# Patient Record
Sex: MALE | Race: WHITE | NOT HISPANIC OR LATINO
[De-identification: names, ages, dates, MRNs, and addresses within clinical notes are randomized per-mention and may not be internally consistent; named-entity substitution may affect disease eponyms.]

---

## 2018-10-25 ENCOUNTER — CHART COPY (OUTPATIENT)
Age: 53
End: 2018-10-25

## 2018-10-25 DIAGNOSIS — M25.562 PAIN IN RIGHT KNEE: ICD-10-CM

## 2018-10-25 DIAGNOSIS — M25.561 PAIN IN RIGHT KNEE: ICD-10-CM

## 2018-10-27 ENCOUNTER — APPOINTMENT (OUTPATIENT)
Dept: ORTHOPEDIC SURGERY | Facility: CLINIC | Age: 53
End: 2018-10-27
Payer: COMMERCIAL

## 2018-10-27 VITALS
DIASTOLIC BLOOD PRESSURE: 89 MMHG | BODY MASS INDEX: 37.22 KG/M2 | TEMPERATURE: 98.8 F | HEIGHT: 70 IN | SYSTOLIC BLOOD PRESSURE: 140 MMHG | WEIGHT: 260 LBS | HEART RATE: 76 BPM

## 2018-10-27 DIAGNOSIS — Z86.79 PERSONAL HISTORY OF OTHER DISEASES OF THE CIRCULATORY SYSTEM: ICD-10-CM

## 2018-10-27 DIAGNOSIS — Z78.9 OTHER SPECIFIED HEALTH STATUS: ICD-10-CM

## 2018-10-27 DIAGNOSIS — M17.11 UNILATERAL PRIMARY OSTEOARTHRITIS, RIGHT KNEE: ICD-10-CM

## 2018-10-27 DIAGNOSIS — M50.20 OTHER CERVICAL DISC DISPLACEMENT, UNSPECIFIED CERVICAL REGION: ICD-10-CM

## 2018-10-27 DIAGNOSIS — Z86.69 PERSONAL HISTORY OF OTHER DISEASES OF THE NERVOUS SYSTEM AND SENSE ORGANS: ICD-10-CM

## 2018-10-27 PROCEDURE — 99205 OFFICE O/P NEW HI 60 MIN: CPT

## 2018-10-27 PROCEDURE — 73564 X-RAY EXAM KNEE 4 OR MORE: CPT | Mod: RT

## 2018-10-27 RX ORDER — METAXALONE 800 MG/1
800 TABLET ORAL
Refills: 0 | Status: ACTIVE | COMMUNITY

## 2018-10-27 RX ORDER — PERINDOPRIL ERBUMINE 8 MG/1
TABLET ORAL
Refills: 0 | Status: ACTIVE | COMMUNITY

## 2018-10-27 RX ORDER — PREGABALIN 300 MG/1
CAPSULE ORAL
Refills: 0 | Status: ACTIVE | COMMUNITY

## 2018-10-27 RX ORDER — CODEINE SULFATE 60 MG/1
TABLET ORAL
Refills: 0 | Status: ACTIVE | COMMUNITY

## 2019-04-02 ENCOUNTER — EMERGENCY (EMERGENCY)
Facility: HOSPITAL | Age: 54
LOS: 1 days | Discharge: HOME | End: 2019-04-02
Attending: EMERGENCY MEDICINE
Payer: COMMERCIAL

## 2019-04-02 VITALS
WEIGHT: 240.08 LBS | OXYGEN SATURATION: 97 % | SYSTOLIC BLOOD PRESSURE: 191 MMHG | HEART RATE: 78 BPM | DIASTOLIC BLOOD PRESSURE: 80 MMHG | RESPIRATION RATE: 18 BRPM | TEMPERATURE: 98 F

## 2019-04-02 DIAGNOSIS — F19.951 OTHER PSYCHOACTIVE SUBSTANCE USE, UNSPECIFIED WITH PSYCHOACTIVE SUBSTANCE-INDUCED PSYCHOTIC DISORDER WITH HALLUCINATIONS: ICD-10-CM

## 2019-04-02 DIAGNOSIS — Z98.89 OTHER SPECIFIED POSTPROCEDURAL STATES: Chronic | ICD-10-CM

## 2019-04-02 DIAGNOSIS — Z96.659 PRESENCE OF UNSPECIFIED ARTIFICIAL KNEE JOINT: Chronic | ICD-10-CM

## 2019-04-02 DIAGNOSIS — F19.20 OTHER PSYCHOACTIVE SUBSTANCE DEPENDENCE, UNCOMPLICATED: ICD-10-CM

## 2019-04-02 DIAGNOSIS — R69 ILLNESS, UNSPECIFIED: ICD-10-CM

## 2019-04-02 LAB
ALBUMIN SERPL ELPH-MCNC: 3.7 G/DL — SIGNIFICANT CHANGE UP (ref 3.3–5.2)
ALP SERPL-CCNC: 44 U/L — SIGNIFICANT CHANGE UP (ref 40–120)
ALT FLD-CCNC: 46 U/L — HIGH
AMPHET UR-MCNC: NEGATIVE — SIGNIFICANT CHANGE UP
ANION GAP SERPL CALC-SCNC: 10 MMOL/L — SIGNIFICANT CHANGE UP (ref 5–17)
APAP SERPL-MCNC: <7.5 UG/ML — LOW (ref 10–26)
APPEARANCE UR: CLEAR — SIGNIFICANT CHANGE UP
APTT BLD: 29.8 SEC — SIGNIFICANT CHANGE UP (ref 27.5–36.3)
AST SERPL-CCNC: 66 U/L — HIGH
BARBITURATES UR SCN-MCNC: NEGATIVE — SIGNIFICANT CHANGE UP
BASOPHILS # BLD AUTO: 0 K/UL — SIGNIFICANT CHANGE UP (ref 0–0.2)
BASOPHILS NFR BLD AUTO: 0.3 % — SIGNIFICANT CHANGE UP (ref 0–2)
BENZODIAZ UR-MCNC: NEGATIVE — SIGNIFICANT CHANGE UP
BILIRUB SERPL-MCNC: 0.2 MG/DL — LOW (ref 0.4–2)
BILIRUB UR-MCNC: NEGATIVE — SIGNIFICANT CHANGE UP
BUN SERPL-MCNC: 15 MG/DL — SIGNIFICANT CHANGE UP (ref 8–20)
CALCIUM SERPL-MCNC: 8.7 MG/DL — SIGNIFICANT CHANGE UP (ref 8.6–10.2)
CHLORIDE SERPL-SCNC: 101 MMOL/L — SIGNIFICANT CHANGE UP (ref 98–107)
CO2 SERPL-SCNC: 29 MMOL/L — SIGNIFICANT CHANGE UP (ref 22–29)
COCAINE METAB.OTHER UR-MCNC: POSITIVE
COLOR SPEC: YELLOW — SIGNIFICANT CHANGE UP
CREAT SERPL-MCNC: 1.09 MG/DL — SIGNIFICANT CHANGE UP (ref 0.5–1.3)
DIFF PNL FLD: ABNORMAL
EOSINOPHIL # BLD AUTO: 0.1 K/UL — SIGNIFICANT CHANGE UP (ref 0–0.5)
EOSINOPHIL NFR BLD AUTO: 1 % — SIGNIFICANT CHANGE UP (ref 0–5)
EPI CELLS # UR: SIGNIFICANT CHANGE UP
ETHANOL SERPL-MCNC: <10 MG/DL — SIGNIFICANT CHANGE UP
GLUCOSE SERPL-MCNC: 76 MG/DL — SIGNIFICANT CHANGE UP (ref 70–115)
GLUCOSE UR QL: 50 MG/DL
HCT VFR BLD CALC: 43.9 % — SIGNIFICANT CHANGE UP (ref 42–52)
HGB BLD-MCNC: 13.8 G/DL — LOW (ref 14–18)
INR BLD: 0.98 RATIO — SIGNIFICANT CHANGE UP (ref 0.88–1.16)
KETONES UR-MCNC: NEGATIVE — SIGNIFICANT CHANGE UP
LEUKOCYTE ESTERASE UR-ACNC: NEGATIVE — SIGNIFICANT CHANGE UP
LYMPHOCYTES # BLD AUTO: 0.8 K/UL — LOW (ref 1–4.8)
LYMPHOCYTES # BLD AUTO: 12.3 % — LOW (ref 20–55)
MCHC RBC-ENTMCNC: 26.7 PG — LOW (ref 27–31)
MCHC RBC-ENTMCNC: 31.4 G/DL — LOW (ref 32–36)
MCV RBC AUTO: 85.1 FL — SIGNIFICANT CHANGE UP (ref 80–94)
METHADONE UR-MCNC: NEGATIVE — SIGNIFICANT CHANGE UP
MONOCYTES # BLD AUTO: 1 K/UL — HIGH (ref 0–0.8)
MONOCYTES NFR BLD AUTO: 13.9 % — HIGH (ref 3–10)
NEUTROPHILS # BLD AUTO: 4.9 K/UL — SIGNIFICANT CHANGE UP (ref 1.8–8)
NEUTROPHILS NFR BLD AUTO: 72.4 % — SIGNIFICANT CHANGE UP (ref 37–73)
NITRITE UR-MCNC: NEGATIVE — SIGNIFICANT CHANGE UP
OPIATES UR-MCNC: POSITIVE
PCP SPEC-MCNC: SIGNIFICANT CHANGE UP
PCP UR-MCNC: NEGATIVE — SIGNIFICANT CHANGE UP
PH UR: 6 — SIGNIFICANT CHANGE UP (ref 5–8)
PLATELET # BLD AUTO: 211 K/UL — SIGNIFICANT CHANGE UP (ref 150–400)
POTASSIUM SERPL-MCNC: 4.6 MMOL/L — SIGNIFICANT CHANGE UP (ref 3.5–5.3)
POTASSIUM SERPL-SCNC: 4.6 MMOL/L — SIGNIFICANT CHANGE UP (ref 3.5–5.3)
PROT SERPL-MCNC: 6.2 G/DL — LOW (ref 6.6–8.7)
PROT UR-MCNC: 30 MG/DL
PROTHROM AB SERPL-ACNC: 11.3 SEC — SIGNIFICANT CHANGE UP (ref 10–12.9)
RBC # BLD: 5.16 M/UL — SIGNIFICANT CHANGE UP (ref 4.6–6.2)
RBC # FLD: 15 % — SIGNIFICANT CHANGE UP (ref 11–15.6)
RBC CASTS # UR COMP ASSIST: SIGNIFICANT CHANGE UP /HPF (ref 0–4)
SALICYLATES SERPL-MCNC: <0.6 MG/DL — LOW (ref 10–20)
SODIUM SERPL-SCNC: 140 MMOL/L — SIGNIFICANT CHANGE UP (ref 135–145)
SP GR SPEC: 1.01 — SIGNIFICANT CHANGE UP (ref 1.01–1.02)
THC UR QL: NEGATIVE — SIGNIFICANT CHANGE UP
TSH SERPL-MCNC: 0.85 UIU/ML — SIGNIFICANT CHANGE UP (ref 0.27–4.2)
UROBILINOGEN FLD QL: NEGATIVE MG/DL — SIGNIFICANT CHANGE UP
WBC # BLD: 6.8 K/UL — SIGNIFICANT CHANGE UP (ref 4.8–10.8)
WBC # FLD AUTO: 6.8 K/UL — SIGNIFICANT CHANGE UP (ref 4.8–10.8)

## 2019-04-02 PROCEDURE — 99284 EMERGENCY DEPT VISIT MOD MDM: CPT

## 2019-04-02 PROCEDURE — 99213 OFFICE O/P EST LOW 20 MIN: CPT

## 2019-04-02 PROCEDURE — 70450 CT HEAD/BRAIN W/O DYE: CPT | Mod: 26

## 2019-04-02 PROCEDURE — 93010 ELECTROCARDIOGRAM REPORT: CPT

## 2019-04-02 RX ORDER — QUETIAPINE FUMARATE 200 MG/1
100 TABLET, FILM COATED ORAL ONCE
Qty: 0 | Refills: 0 | Status: COMPLETED | OUTPATIENT
Start: 2019-04-02 | End: 2019-04-02

## 2019-04-02 RX ORDER — OXYCODONE HYDROCHLORIDE 5 MG/1
15 TABLET ORAL EVERY 6 HOURS
Qty: 0 | Refills: 0 | Status: COMPLETED | OUTPATIENT
Start: 2019-04-02 | End: 2019-04-09

## 2019-04-02 RX ORDER — HALOPERIDOL DECANOATE 100 MG/ML
2 INJECTION INTRAMUSCULAR EVERY 6 HOURS
Qty: 0 | Refills: 0 | Status: DISCONTINUED | OUTPATIENT
Start: 2019-04-02 | End: 2019-04-07

## 2019-04-02 RX ORDER — LISINOPRIL 2.5 MG/1
20 TABLET ORAL DAILY
Qty: 0 | Refills: 0 | Status: DISCONTINUED | OUTPATIENT
Start: 2019-04-02 | End: 2019-04-07

## 2019-04-02 RX ORDER — HALOPERIDOL DECANOATE 100 MG/ML
2 INJECTION INTRAMUSCULAR EVERY 6 HOURS
Qty: 0 | Refills: 0 | Status: DISCONTINUED | OUTPATIENT
Start: 2019-04-02 | End: 2019-04-02

## 2019-04-02 RX ORDER — KETOROLAC TROMETHAMINE 30 MG/ML
15 SYRINGE (ML) INJECTION ONCE
Qty: 0 | Refills: 0 | Status: DISCONTINUED | OUTPATIENT
Start: 2019-04-02 | End: 2019-04-02

## 2019-04-02 RX ORDER — THIAMINE MONONITRATE (VIT B1) 100 MG
100 TABLET ORAL DAILY
Qty: 0 | Refills: 0 | Status: DISCONTINUED | OUTPATIENT
Start: 2019-04-02 | End: 2019-04-07

## 2019-04-02 RX ORDER — FOLIC ACID 0.8 MG
1 TABLET ORAL DAILY
Qty: 0 | Refills: 0 | Status: DISCONTINUED | OUTPATIENT
Start: 2019-04-02 | End: 2019-04-07

## 2019-04-02 RX ADMIN — QUETIAPINE FUMARATE 100 MILLIGRAM(S): 200 TABLET, FILM COATED ORAL at 22:52

## 2019-04-02 RX ADMIN — OXYCODONE HYDROCHLORIDE 15 MILLIGRAM(S): 5 TABLET ORAL at 23:55

## 2019-04-02 RX ADMIN — Medication 1 TABLET(S): at 17:45

## 2019-04-02 RX ADMIN — Medication 100 MILLIGRAM(S): at 17:45

## 2019-04-02 RX ADMIN — LISINOPRIL 20 MILLIGRAM(S): 2.5 TABLET ORAL at 17:46

## 2019-04-02 RX ADMIN — OXYCODONE HYDROCHLORIDE 15 MILLIGRAM(S): 5 TABLET ORAL at 17:46

## 2019-04-02 RX ADMIN — Medication 15 MILLIGRAM(S): at 13:29

## 2019-04-02 RX ADMIN — Medication 1 MILLIGRAM(S): at 17:45

## 2019-04-02 RX ADMIN — OXYCODONE HYDROCHLORIDE 15 MILLIGRAM(S): 5 TABLET ORAL at 19:08

## 2019-04-02 NOTE — ED PROVIDER NOTE - PROGRESS NOTE DETAILS
As per sign-out from Dr. Bey patient with abnormal behavior/psychosis. Patient will remain overnight for re-evaluation and may need inpatient psychiatric admission.

## 2019-04-02 NOTE — ED BEHAVIORAL HEALTH ASSESSMENT NOTE - DESCRIPTION (FIRST USE, LAST USE, QUANTITY, FREQUENCY, DURATION)
heavy episodic use more recently daily until saturday prescribed  90 mg daily use of old Rx for Xanax Adderall phentermine for weight loss

## 2019-04-02 NOTE — ED ADULT NURSE NOTE - NSIMPLEMENTINTERV_GEN_ALL_ED
Implemented All Universal Safety Interventions:  [place] to call system. Call bell, personal items and telephone within reach. Instruct patient to call for assistance. Room bathroom lighting operational. Non-slip footwear when patient is off stretcher. Physically safe environment: no spills, clutter or unnecessary equipment. Stretcher in lowest position, wheels locked, appropriate side rails in place. Implemented All Fall Risk Interventions:  [place] to call system. Call bell, personal items and telephone within reach. Instruct patient to call for assistance. Room bathroom lighting operational. Non-slip footwear when patient is off stretcher. Physically safe environment: no spills, clutter or unnecessary equipment. Stretcher in lowest position, wheels locked, appropriate side rails in place. Provide visual cue, wrist band, yellow gown, etc. Monitor gait and stability. Monitor for mental status changes and reorient to person, place, and time. Review medications for side effects contributing to fall risk. Reinforce activity limits and safety measures with patient and family.

## 2019-04-02 NOTE — ED ADULT NURSE NOTE - CAS EDN DISCHARGE ASSESSMENT
Alert and oriented to person, place and time/denies suicidal or homicidal ideation, denies any psychotic symptoms

## 2019-04-02 NOTE — SBIRT NOTE. - NSSBIRTSERVICES_GEN_A_ED_FT
Provided SBIRT services: Full screen positive. Brief Intervention Performed. Screening results were reviewed with the patient and patient was provided information about healthy guidelines and potential negative consequences associated with level of risk. Motivation and readiness to reduce or stop use was discussed and goals and activities to make changes were suggested/offered.   Pt stated that he recently completed a 90 day cycle of diet pills (he's not sure what type)   Audit Score: 4  DAST Score: 1  Duration = 10 Minutes

## 2019-04-02 NOTE — PHARMACOTHERAPY INTERVENTION NOTE - COMMENTS
Medication reconciliation completed by speaking to patient at bedside, as well as by calling the pharmacy and verifying with . He is picked up phentermine, pregabalin, oxycodone, and eszopiclone in March. Patient stated to be noncompliant with phentermine and pregabalin. He also stated to take alprazolam as needed for insomnia, although no history of prescription found in . Medication reconciliation completed by speaking to patient at bedside, as well as by calling the pharmacy and verifying with . He is picked up phentermine, pregabalin, oxycodone, and eszopiclone in March. Patient stated to be noncompliant with phentermine and pregabalin. He also stated to take alprazolam as needed for insomnia, although no history of prescription found in . Unable to verify strength and frequency of alprazolam.

## 2019-04-02 NOTE — ED BEHAVIORAL HEALTH ASSESSMENT NOTE - CURRENT MEDICATION
Phentermine 37.5mg PO QD Pregabalin 150mg PO BID  Viagra Cialis  Metaxalone 800mg PO TID  Perindopril 8mg PO QD  Eszopiclone 3mg PO QHS  Oxycodone 15mg PO Q4h

## 2019-04-02 NOTE — ED PROVIDER NOTE - CLINICAL SUMMARY MEDICAL DECISION MAKING FREE TEXT BOX
Pt brought in by family for increased paranoid delusions, visual hallucinations and emotional lability;  Plan to obtain labs/ CTh, EKG and psychiatric evaluation

## 2019-04-02 NOTE — ED BEHAVIORAL HEALTH ASSESSMENT NOTE - HPI (INCLUDE ILLNESS QUALITY, SEVERITY, DURATION, TIMING, CONTEXT, MODIFYING FACTORS, ASSOCIATED SIGNS AND SYMPTOMS)
presents with family for evaluation of recent onset psychotic episode as manifested by visual hallucinations, paranoid delusions, agitation Tore house apart looking for "bugs, camera and other devices" (son showed photo of ransacked house) He reports stressors of tasking in his brother's teenagers after his brother reported murdered his wife (pt's sister in law), numerous surgeries for orthopedic injuries, substance use including prescribed Lyrica, phentermine, oxycodone-90 mg daily, also old Rx for xanax , son's Adderall )Saturday night) and vodka significant daily use last Saturday, other stressors include 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

## 2019-04-02 NOTE — ED ADULT NURSE NOTE - CHIEF COMPLAINT QUOTE
as per family pt increasingly agitated son reports that pt has made paranoid statements accusing wife of cheating smashing her phone, asking if there are cameras following him.  pt denies al upon arrival   admitts to aggitation and anger denies s/i and h/i

## 2019-04-02 NOTE — ED BEHAVIORAL HEALTH ASSESSMENT NOTE - DETAILS
took in 2 of his brother's children after brother reportedly murdered their mother in January NA brother ? schizophrenia not relevent insomnia family present Dr Heard

## 2019-04-02 NOTE — ED PROVIDER NOTE - OBJECTIVE STATEMENT
53yoM with h/o ADHD, dyslexia, chronic back pain , R sided sciatica, and b/l knee pain with h/o back and bl/ knee surgery;  brought in by family for increasing paranoid behavior, visual hallucinations, angry outbursts.  Family notes that pt has a long history of being overly paranoid but is functional, owns his own business;  they note that since Friday he has not been sleeping, has had increased visual hallucinations, accused wife of cheating and smashed her phone.   Pt admits to visual hallucinations but denies auditory hallucinations;  He does admit to taking his son's Adderall and drinking alcohol on Saturday night which made him stay up all night; otherwise he denies significant drinking or substance use.  Pt denies any headache, weakness, chest pain, SOB, fevers, chills.  Son reports he has been under financial stress , stress regarding his brother who is a paranoid schizophrenic and recently murdered his sister in law, the taking in of his brother's childrens and another family member.  son notes he talks about undergoing government surveillance and thinks that [employer] are watching him.

## 2019-04-02 NOTE — ED PROVIDER NOTE - CONSTITUTIONAL APPEARANCE HYGIENE, MLM
MUSCULOSKELETAL - ADULT    • Return mobility to safest level of function Progressing        RESPIRATORY - ADULT    • Achieves optimal ventilation and oxygenation Progressing        RISK FOR INFECTION - ADULT    • Absence of fever/infection during anticipat well appearing

## 2019-04-02 NOTE — ED ADULT NURSE NOTE - PSH
H/O colonoscopy  (2013)  H/O inguinal hernia repair  right (1998)  H/O total knee replacement  (1998) multiple other knee surgeries  History of tonsillectomy  (1975)

## 2019-04-02 NOTE — ED ADULT NURSE NOTE - OBJECTIVE STATEMENT
recd pt  A/Ox3, pt with son who states father has been acting weird, has been hallucinating and states extreme paranoia, pt denies SI/HI but admits to being under a lot of stress since he recently took in 2 of his family members children who don't respect him. pt was at [facility] yesterday with neg martha, pt  denies chest pain or sob. pt c/o chronic back fatou and knee pain issues unrelated to his visit today, pt Respirations are even and unlabored, lungs cta, +bowel x4 quads, abdomen soft, nontender/nondistended, no guarding, rebound or rigidity noted, skin w/d/i.

## 2019-04-02 NOTE — ED ADULT TRIAGE NOTE - CHIEF COMPLAINT QUOTE
as per family pt increasingly agitated son reports that pt has made paranoid statements accusing wife of cheating smashing her phone, asking if there are cameras following him as per family pt increasingly agitated son reports that pt has made paranoid statements accusing wife of cheating smashing her phone, asking if there are cameras following him.  pt denies al upon arrival   admitts to aggitation and anger denies s/i and h/i

## 2019-04-02 NOTE — ED PROVIDER NOTE - CARE PLAN
Principal Discharge DX:	Drug psychosis with hallucinations  Secondary Diagnosis:	Polysubstance (including opioids) dependence, daily use

## 2019-04-03 VITALS
TEMPERATURE: 98 F | OXYGEN SATURATION: 100 % | RESPIRATION RATE: 18 BRPM | DIASTOLIC BLOOD PRESSURE: 89 MMHG | HEART RATE: 76 BPM | SYSTOLIC BLOOD PRESSURE: 154 MMHG

## 2019-04-03 LAB
CULTURE RESULTS: NO GROWTH — SIGNIFICANT CHANGE UP
SPECIMEN SOURCE: SIGNIFICANT CHANGE UP

## 2019-04-03 PROCEDURE — 85730 THROMBOPLASTIN TIME PARTIAL: CPT

## 2019-04-03 PROCEDURE — 85610 PROTHROMBIN TIME: CPT

## 2019-04-03 PROCEDURE — 80307 DRUG TEST PRSMV CHEM ANLYZR: CPT

## 2019-04-03 PROCEDURE — 81001 URINALYSIS AUTO W/SCOPE: CPT

## 2019-04-03 PROCEDURE — 80053 COMPREHEN METABOLIC PANEL: CPT

## 2019-04-03 PROCEDURE — 99284 EMERGENCY DEPT VISIT MOD MDM: CPT | Mod: 25

## 2019-04-03 PROCEDURE — 36415 COLL VENOUS BLD VENIPUNCTURE: CPT

## 2019-04-03 PROCEDURE — 87086 URINE CULTURE/COLONY COUNT: CPT

## 2019-04-03 PROCEDURE — 96374 THER/PROPH/DIAG INJ IV PUSH: CPT

## 2019-04-03 PROCEDURE — 70450 CT HEAD/BRAIN W/O DYE: CPT

## 2019-04-03 PROCEDURE — 85027 COMPLETE CBC AUTOMATED: CPT

## 2019-04-03 PROCEDURE — 93005 ELECTROCARDIOGRAM TRACING: CPT

## 2019-04-03 PROCEDURE — 84443 ASSAY THYROID STIM HORMONE: CPT

## 2019-04-03 RX ORDER — LISINOPRIL 2.5 MG/1
10 TABLET ORAL ONCE
Qty: 0 | Refills: 0 | Status: COMPLETED | OUTPATIENT
Start: 2019-04-03 | End: 2019-04-03

## 2019-04-03 RX ADMIN — Medication 100 MILLIGRAM(S): at 15:22

## 2019-04-03 RX ADMIN — OXYCODONE HYDROCHLORIDE 15 MILLIGRAM(S): 5 TABLET ORAL at 01:07

## 2019-04-03 RX ADMIN — OXYCODONE HYDROCHLORIDE 15 MILLIGRAM(S): 5 TABLET ORAL at 06:20

## 2019-04-03 RX ADMIN — Medication 1 TABLET(S): at 12:47

## 2019-04-03 RX ADMIN — LISINOPRIL 20 MILLIGRAM(S): 2.5 TABLET ORAL at 06:20

## 2019-04-03 RX ADMIN — OXYCODONE HYDROCHLORIDE 15 MILLIGRAM(S): 5 TABLET ORAL at 07:29

## 2019-04-03 RX ADMIN — OXYCODONE HYDROCHLORIDE 15 MILLIGRAM(S): 5 TABLET ORAL at 12:47

## 2019-04-03 RX ADMIN — Medication 1 MILLIGRAM(S): at 12:47

## 2019-04-03 RX ADMIN — OXYCODONE HYDROCHLORIDE 15 MILLIGRAM(S): 5 TABLET ORAL at 15:23

## 2019-04-03 RX ADMIN — LISINOPRIL 10 MILLIGRAM(S): 2.5 TABLET ORAL at 15:21

## 2019-04-03 NOTE — ED ADULT NURSE REASSESSMENT NOTE - STATUS
Hold in ED for reassessment
Hold in ED for reassessment
awaiting consult
awaiting discharge, no change
awaiting discharge, no change
hold for reassessment
reassessment

## 2019-04-03 NOTE — ED ADULT NURSE REASSESSMENT NOTE - COMFORT CARE
darkened lights
meal provided/wait time explained/plan of care explained/po fluids offered
meal provided
meal provided/plan of care explained
plan of care explained
plan of care explained
ambulated to bathroom

## 2019-04-03 NOTE — ED ADULT NURSE REASSESSMENT NOTE - GENERAL PATIENT STATE
comfortable appearance/cooperative/resting/sleeping/smiling/interactive
resting/sleeping/comfortable appearance/cooperative
comfortable appearance/cooperative
cooperative
cooperative
cooperative/resting/sleeping
comfortable appearance/cooperative

## 2019-04-03 NOTE — ED BEHAVIORAL HEALTH NOTE - BEHAVIORAL HEALTH NOTE
PROGRESS NOTE: 19 @ 10:11  	  • Reason for Ongoing Consultation: 	Reevaluation    ID: [id number] Male with HEALTH ISSUES - PROBLEM Dx:  Deferred condition on axis II  Polysubstance (including opioids) dependence, daily use  Drug psychosis with hallucinations          INTERVAL DATA:   • Interval Chief Complaint: "I'm feeling fine."  • Interval History: Chart reviewed. Patient presents resting comfortably in bed. Patient states he feels fine mentally and wants to go home. Denies hallucinations or aggressive thoughts. Reports multiple life stressors such as arguing with wife, family issues, work, chronic pain, need for knee surgery. He admits to cocaine abuse and the use of non-prescribed weightloss/muscle building injections for the past three months. Patient states he will stop injections and wants to seek counseling. He denies SI/HI, acute signs of depression or psychosis.    REVIEW OF SYSTEMS:   • Constitutional Symptoms	No complaints  • Eyes	No complaints  • Ears / Nose / Throat / Mouth	No complaints  • Cardiovascular	No complaints  • Respiratory	No complaints  • Gastrointestinal	No complaints  • Genitourinary	No complaints  • Musculoskeletal	Knee pain - Chronic  • Skin	No complaints  • Neurological	No complaints  • Psychiatric (see HPI)	See HPI  • Endocrine	No complaints  • Hematologic / Lymphatic	No complaints  • Allergic / Immunologic	No complaints    REVIEW OF VITALS/LABS/IMAGING/INVESTIGATIONS:   • Vital signs reviewed: Yes  • Vital Signs:	    T(C): 36.8 (19 @ 07:41), Max: 36.9 (19 @ 14:37)  HR: 68 (19 @ 07:41) (68 - 84)  BP: 133/87 (19 @ 07:41) (126/74 - 191/80)  RR: 20 (19 @ 07:41) (18 - 20)  SpO2: 96% (19 @ 07:41) (96% - 99%)    • Available labs reviewed: Yes  • Available Lab Results:                           13.8   6.8   )-----------( 211      ( 2019 11:51 )             43.9         140  |  101  |  15.0  ----------------------------<  76  4.6   |  29.0  |  1.09    Ca    8.7      2019 11:51    TPro  6.2<L>  /  Alb  3.7  /  TBili  0.2<L>  /  DBili  x   /  AST  66<H>  /  ALT  46<H>  /  AlkPhos  44  04-02    LIVER FUNCTIONS - ( 2019 11:51 )  Alb: 3.7 g/dL / Pro: 6.2 g/dL / ALK PHOS: 44 U/L / ALT: 46 U/L / AST: 66 U/L / GGT: x           PT/INR - ( 2019 11:51 )   PT: 11.3 sec;   INR: 0.98 ratio         PTT - ( 2019 11:51 )  PTT:29.8 sec  Urinalysis Basic - ( 2019 12:20 )    Color: Yellow / Appearance: Clear / S.010 / pH: x  Gluc: x / Ketone: Negative  / Bili: Negative / Urobili: Negative mg/dL   Blood: x / Protein: 30 mg/dL / Nitrite: Negative   Leuk Esterase: Negative / RBC: 0-2 /HPF / WBC x   Sq Epi: x / Non Sq Epi: Occasional / Bacteria: x          MEDICATIONS:      PRN Medications:  • PRN Medications since last evaluation	  • PRN Details	    Current Medications:   folic acid 1 milliGRAM(s) Oral daily  haloperidol    Concentrate 2 milliGRAM(s) Oral every 6 hours PRN  lisinopril 20 milliGRAM(s) Oral daily  LORazepam     Tablet 2 milliGRAM(s) Oral every 6 hours PRN  multivitamin 1 Tablet(s) Oral daily  oxyCODONE    IR 15 milliGRAM(s) Oral every 6 hours  thiamine 100 milliGRAM(s) Oral daily     Medication Side Effects:  • Medication Side Effects or Adverse Reactions (new or ongoing)	None known    MENTAL STATUS EXAM:   • Level of Consciousness	Alert  • General Appearance	Well developed  • Body Habitus	Overweight  • Hygiene	Good  • Grooming	Good  • Behavior	Cooperative  • Eye Contact	Good  • Relatedness	Good  • Impulse Control	Normal  • Muscle Tone / Strength	Normal muscle tone/strength  • Abnormal Movements	No abnormal movements  • Gait / Station	Normal gait / station  • Speech Volume	Normal  • Speech Rate	Normal  • Speech Spontaneity	Normal  • Speech Articulation	Normal  • Mood	Normal  • Affect Quality	Euthymic  • Affect Range	Full  • Affect Congruence	Congruent  • Thought Process	Linear  • Thought Associations	Normal  • Thought Content	Unremarkable  • Perceptions	No abnormalities  • Oriented to Time	Yes  • Oriented to Place	Yes  • Oriented to Situation	Yes  • Oriented to Person	Yes  • Attention / Concentration	Normal  • Estimated Intelligence	Average  • Recent Memory	Normal  • Remote Memory	Normal  • Fund of Knowledge	Normal  • Language	No abnormalities noted  • Judgment (regarding everyday events)	Poor  • Insight (regarding psychiatric illness)	Fair    SUICIDALITY:   • Suicidality (Interval)	none known    HOMICIDALITY/AGGRESSION:   • Homicidality/Aggression	none known    DIAGNOSIS DSM-V:    Psychiatric Diagnosis (Corresponds to DSM-IV Axis I, II):   HEALTH ISSUES - PROBLEM Dx:  Deferred condition on axis II  Polysubstance (including opioids) dependence, daily use  Drug psychosis with hallucinations           Medical Diagnosis (Corresponds to DSM-IV Axis III):  • Axis III	      ASSESSMENT OF CURRENT CONDITION:   Summary (include case differential, formulation and patient response to therapy): 54yo male with polysubstance abuse, currently stable. Spoke with wife and son, Epi, regarding cocaine abuse and need fo substance abuse counseling.    Risk Assessment (consider static vs modifiable risk factors and protective factors; comment on level of risk for dangerous behavior): Elevated risk: Male, hx of polysubstance use, reports multiple life stressors, reported aggressive behavior, although currently denies SI/HI, depression, signs of psychosis, willing to seek treatment, has supportive family and is involved in work.    PLAN  Discharge home  Counseling for substance abuse/anger

## 2019-04-03 NOTE — ED BEHAVIORAL HEALTH NOTE - BEHAVIORAL HEALTH NOTE
• Reason for Ongoing Consultation: 	Reevaluation    ID: [id number] Male with HEALTH ISSUES - PROBLEM Dx:  Deferred condition on axis II  Polysubstance (including opioids) dependence, daily use  Drug psychosis with hallucinations          INTERVAL DATA:   • Interval Chief Complaint: "I' feeling better"  • Interval History: Chart reviewed. Notes from Dr. Rivero appreciated. Patient was interviewed for second opinion to assess patient's mental status.   Patient presents resting comfortably in bed.  Patient is a 54 y/o male with reported past psychiatric history of ADHD, no treatment in years, no prior psychiatric hospitalizations, no prior suicide attempts, with recent ETOH/Cocaine abuse and  use of injectible substance that he has received through internet (anabolic steroids?).  Patient presented with report of recent onset of psychotic episodes with visual hallucinations and paranoid delusions.          Patient admits to some marital conflict with wife and financial distress.  He admits that he was "out of my mind" over the weekend. He has been "going crazy" with cocaine and alcohol over the weekends and evenings over the last several weeks.  He reports he was injecting (presumed steroids) at the advise of his brother to lose weight and has lost 30 lbs.   He has also been taking prescribed medications which include Lyrica, Phentermine, Oxycodone, as well as old tx for Xanax and son's Adderall.         Patient describes that he was not thinking correctly over the weekend.  He had not been sleeping for several days. He admits that he was seeing spots and images on television that was shut off. He reports he felt sure that his wife was going to run away when he fell asleep, and felt that he was being monitored by cameras. He is able to reality test and reports that he no longer think that any of the above were true and he was not thinking right.  He attributes this to substances he was taking.  Patient states "I know that I was hallucinating". He denies having these experiences outside of any substance use and has no history of prior psychotic phenomenon.          At this time, pt denies depressed mood, he denies any suicidal ideation, intent or plan as well as any aggressive or violent behavior towards others. Patient states "I would never hurt myself".  No current symptoms bizarre happiness, unusual energy, unusual nightime excitation or other common symptoms of bianca. Pt denies hearing voices or seeing things.  No delusions were elicited.  Patient denies pervasive anxiety,  panic attacks, obsessions or compulsions.         Patient has been observed overnight. He has not exhibited any odd or bizzare behavior. He does not appear to be responding to internal stimuli. He slept well overnight.  He has been eating.  He has not exhibiting any aggressive, violent or impulsive behavior.  His sensorium has cleared and symptoms appear substance induced.  He is remorseful and "embarrased" about prior behavior.  Patient staets that he is no longer going to use cocaine, ETOH, or injectable steroids.  He feels that these substances affected his mind. Patient states " Patient no longer believes that his wife will leave him. He states "I am taking her at her word". He reports if family feels unsafe, he can stay an parents house in [place] until they are.  He reports house is unoccupied at this current time.       REVIEW OF SYSTEMS:   • Constitutional Symptoms	No complaints  • Eyes	No complaints  • Ears / Nose / Throat / Mouth	No complaints  • Cardiovascular	No complaints  • Respiratory	No complaints  • Gastrointestinal	No complaints  • Genitourinary	No complaints  • Musculoskeletal	Knee pain - Chronic  • Skin	No complaints  • Neurological	No complaints  • Psychiatric (see HPI)	See HPI  • Endocrine	No complaints  • Hematologic / Lymphatic	No complaints  • Allergic / Immunologic	No complaints    REVIEW OF VITALS/LABS/IMAGING/INVESTIGATIONS:   • Vital signs reviewed: Yes  • Vital Signs:	    T(C): 36.8 (19 @ 07:41), Max: 36.9 (19 @ 14:37)  HR: 68 (19 @ 07:41) (68 - 84)  BP: 133/87 (19 @ 07:41) (126/74 - 191/80)  RR: 20 (19 @ 07:41) (18 - 20)  SpO2: 96% (19 @ 07:41) (96% - 99%)    • Available labs reviewed: Yes  • Available Lab Results:                           13.8   6.8   )-----------( 211      ( 2019 11:51 )             43.9     -    140  |  101  |  15.0  ----------------------------<  76  4.6   |  29.0  |  1.09    Ca    8.7      2019 11:51    TPro  6.2<L>  /  Alb  3.7  /  TBili  0.2<L>  /  DBili  x   /  AST  66<H>  /  ALT  46<H>  /  AlkPhos  44  04-02    LIVER FUNCTIONS - ( 2019 11:51 )  Alb: 3.7 g/dL / Pro: 6.2 g/dL / ALK PHOS: 44 U/L / ALT: 46 U/L / AST: 66 U/L / GGT: x           PT/INR - ( 2019 11:51 )   PT: 11.3 sec;   INR: 0.98 ratio         PTT - ( 2019 11:51 )  PTT:29.8 sec  Urinalysis Basic - ( 2019 12:20 )    Color: Yellow / Appearance: Clear / S.010 / pH: x  Gluc: x / Ketone: Negative  / Bili: Negative / Urobili: Negative mg/dL   Blood: x / Protein: 30 mg/dL / Nitrite: Negative   Leuk Esterase: Negative / RBC: 0-2 /HPF / WBC x   Sq Epi: x / Non Sq Epi: Occasional / Bacteria: x          MEDICATIONS:      PRN Medications:  • PRN Medications since last evaluation	  • PRN Details	    Current Medications:   folic acid 1 milliGRAM(s) Oral daily  haloperidol    Concentrate 2 milliGRAM(s) Oral every 6 hours PRN  lisinopril 20 milliGRAM(s) Oral daily  LORazepam     Tablet 2 milliGRAM(s) Oral every 6 hours PRN  multivitamin 1 Tablet(s) Oral daily  oxyCODONE    IR 15 milliGRAM(s) Oral every 6 hours  thiamine 100 milliGRAM(s) Oral daily     Medication Side Effects:  • Medication Side Effects or Adverse Reactions (new or ongoing)	None known    MENTAL STATUS EXAM:   • Level of Consciousness	Alert  • General Appearance	Well developed  • Body Habitus	Overweight  • Hygiene	Good  • Grooming	Good  • Behavior	Cooperative  • Eye Contact	Good  • Relatedness	Good  • Impulse Control	Normal  • Muscle Tone / Strength	Normal muscle tone/strength  • Abnormal Movements	No abnormal movements  • Gait / Station	Normal gait / station  • Speech Volume	Normal  • Speech Rate	Normal  • Speech Spontaneity	Normal  • Speech Articulation	Normal  • Mood	Normal  • Affect Quality	Euthymic  • Affect Range	Full  • Affect Congruence	Congruent  • Thought Process	Linear  • Thought Associations	Normal  • Thought Content	Unremarkable  • Perceptions	No abnormalities  • Oriented to Time	Yes  • Oriented to Place	Yes  • Oriented to Situation	Yes  • Oriented to Person	Yes  • Attention / Concentration	Normal  • Estimated Intelligence	Average  • Recent Memory	Normal  • Remote Memory	Normal  • Fund of Knowledge	Normal  • Language	No abnormalities noted  • Judgment (regarding everyday events)	Poor  • Insight (regarding psychiatric illness)	Fair    SUICIDALITY:   • Suicidality (Interval)	none known    HOMICIDALITY/AGGRESSION:   • Homicidality/Aggression	none known    DIAGNOSIS DSM-V:    Psychiatric Diagnosis (Corresponds to DSM-IV Axis I, II):   HEALTH ISSUES - PROBLEM Dx:  Deferred condition on axis II  Polysubstance (including opioids) dependence, daily use  Drug psychosis with hallucinations           Medical Diagnosis (Corresponds to DSM-IV Axis III):  • Axis III	  see medical note.     ASSESSMENT OF CURRENT CONDITION:   Summary (include case differential, formulation and patient response to therapy): 52yo male with polysubstance abuse, currently stable, presented with psychosis secondary to substance use.  Patient's symptoms have resolved.  No psychotic sx's were elicited, denies any S/H I/I/P, patient is able to reality to test.  There are no current grounds so support involuntary admission to the hospital.  Patient is requesting discharge, and is able to engage in direct problems solving and willing to follow up with treatment.      Risk Assessment (consider static vs modifiable risk factors and protective factors; comment on level of risk for dangerous behavior): Elevated risk: Male, hx of polysubstance use, reports multiple life stressors, reported aggressive behavior, although currently denies SI/HI, no history of prior  depression, recent signs of psychosis (drug induced), willing to seek treatment, has supportive family and is involved in work. Currently sober, reports he will no longer use substances, resolution of psychotic sx's, with fair insight, appropriate for outpatient care.     PLAN  Agree with Dr. Rivero's plan  Discharge home  Counseling for substance abuse/anger.

## 2019-04-03 NOTE — ED BEHAVIORAL HEALTH NOTE - BEHAVIORAL HEALTH NOTE
SW Note: Discussed  eval with Dr. Rivero. Dr. Otero met with pt as well for a second opinion. Pt has been cleared for discharge. Followed up with [facility], Porfirio Dave. Porfirio stated he met with pt and he denied having a substance abuse issue and needing tx. I met with pt to discuss referrals for O/P counseling. Also talked about the resources available from [facility]. The pt denied having a substance abuse issue and needing referrals for tx. He declined referrals for o/p MH counseling but stated he might explore it on his own. Spoke with his son Denzel([phone])  and Spouse Nury ( [phone]) via phone. Both aware that the pt was cleared for discharge and declined aftercare referrals. The pts spouse asked to speak with her  on the phone. Brought unit cell to the pt and they spoke. The pt told her that he is not accepting any appointments. Spoke to hie spouse after they spoke and she plans to pick him up shortly. Resource sheet will be given with discharge papers. SW Note: Discussed  eval with Dr. Rivero. Dr. Otero met with pt as well for a second opinion. Pt has been cleared for discharge. Followed up with [facility], Porfirio Dave. Porfirio stated he met with pt and he denied having a substance abuse issue and needing tx. I met with pt to discuss referrals for O/P counseling. Also talked about the resources available from [facility]. The pt denied having a substance abuse issue and needing referrals for tx. He declined referrals for o/p MH counseling but stated he might explore it on his own. Spoke with his son Denzel([phone])  and Spouse Nury ( [phone]) via phone. Both aware that the pt was cleared for discharge and declined aftercare referrals for MH counseling . The pts spouse asked to speak with her  on the phone. Brought unit cell to the pt and they spoke. The pt told her that he is not accepting any appointments. Spoke to hie spouse after they spoke and she plans to pick him up shortly. Resource sheet will be given with discharge papers.
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
## 2019-06-14 ENCOUNTER — OUTPATIENT (OUTPATIENT)
Dept: OUTPATIENT SERVICES | Facility: HOSPITAL | Age: 54
LOS: 1 days | Discharge: HOME | End: 2019-06-14
Payer: COMMERCIAL

## 2019-06-14 VITALS
OXYGEN SATURATION: 96 % | DIASTOLIC BLOOD PRESSURE: 85 MMHG | RESPIRATION RATE: 18 BRPM | TEMPERATURE: 98 F | SYSTOLIC BLOOD PRESSURE: 138 MMHG | WEIGHT: 220.46 LBS | HEIGHT: 70 IN | HEART RATE: 63 BPM

## 2019-06-14 VITALS
TEMPERATURE: 98 F | HEIGHT: 70 IN | SYSTOLIC BLOOD PRESSURE: 138 MMHG | RESPIRATION RATE: 18 BRPM | WEIGHT: 220.46 LBS | OXYGEN SATURATION: 96 % | DIASTOLIC BLOOD PRESSURE: 85 MMHG | HEART RATE: 63 BPM

## 2019-06-14 DIAGNOSIS — Z98.89 OTHER SPECIFIED POSTPROCEDURAL STATES: Chronic | ICD-10-CM

## 2019-06-14 DIAGNOSIS — I10 ESSENTIAL (PRIMARY) HYPERTENSION: ICD-10-CM

## 2019-06-14 DIAGNOSIS — M17.11 UNILATERAL PRIMARY OSTEOARTHRITIS, RIGHT KNEE: ICD-10-CM

## 2019-06-14 DIAGNOSIS — Z98.890 OTHER SPECIFIED POSTPROCEDURAL STATES: Chronic | ICD-10-CM

## 2019-06-14 DIAGNOSIS — Z01.818 ENCOUNTER FOR OTHER PREPROCEDURAL EXAMINATION: ICD-10-CM

## 2019-06-14 DIAGNOSIS — M54.9 DORSALGIA, UNSPECIFIED: ICD-10-CM

## 2019-06-14 DIAGNOSIS — Z96.659 PRESENCE OF UNSPECIFIED ARTIFICIAL KNEE JOINT: Chronic | ICD-10-CM

## 2019-06-14 LAB
ANION GAP SERPL CALC-SCNC: 5 MMOL/L — SIGNIFICANT CHANGE UP (ref 5–17)
APTT BLD: 31.1 SEC — SIGNIFICANT CHANGE UP (ref 28.5–37)
BASOPHILS # BLD AUTO: 0.04 K/UL — SIGNIFICANT CHANGE UP (ref 0–0.2)
BASOPHILS NFR BLD AUTO: 0.7 % — SIGNIFICANT CHANGE UP (ref 0–2)
BLD GP AB SCN SERPL QL: SIGNIFICANT CHANGE UP
BUN SERPL-MCNC: 17 MG/DL — SIGNIFICANT CHANGE UP (ref 7–23)
CALCIUM SERPL-MCNC: 9.1 MG/DL — SIGNIFICANT CHANGE UP (ref 8.5–10.1)
CHLORIDE SERPL-SCNC: 104 MMOL/L — SIGNIFICANT CHANGE UP (ref 96–108)
CO2 SERPL-SCNC: 30 MMOL/L — SIGNIFICANT CHANGE UP (ref 22–31)
CREAT SERPL-MCNC: 1.35 MG/DL — HIGH (ref 0.5–1.3)
EOSINOPHIL # BLD AUTO: 0.14 K/UL — SIGNIFICANT CHANGE UP (ref 0–0.5)
EOSINOPHIL NFR BLD AUTO: 2.5 % — SIGNIFICANT CHANGE UP (ref 0–6)
GLUCOSE SERPL-MCNC: 64 MG/DL — LOW (ref 70–99)
HCT VFR BLD CALC: 47.1 % — SIGNIFICANT CHANGE UP (ref 39–50)
HGB BLD-MCNC: 14.5 G/DL — SIGNIFICANT CHANGE UP (ref 13–17)
IMM GRANULOCYTES NFR BLD AUTO: 0.4 % — SIGNIFICANT CHANGE UP (ref 0–1.5)
INR BLD: 0.98 RATIO — SIGNIFICANT CHANGE UP (ref 0.88–1.16)
LYMPHOCYTES # BLD AUTO: 1.01 K/UL — SIGNIFICANT CHANGE UP (ref 1–3.3)
LYMPHOCYTES # BLD AUTO: 18.2 % — SIGNIFICANT CHANGE UP (ref 13–44)
MCHC RBC-ENTMCNC: 26 PG — LOW (ref 27–34)
MCHC RBC-ENTMCNC: 30.8 GM/DL — LOW (ref 32–36)
MCV RBC AUTO: 84.6 FL — SIGNIFICANT CHANGE UP (ref 80–100)
MONOCYTES # BLD AUTO: 0.67 K/UL — SIGNIFICANT CHANGE UP (ref 0–0.9)
MONOCYTES NFR BLD AUTO: 12.1 % — SIGNIFICANT CHANGE UP (ref 2–14)
NEUTROPHILS # BLD AUTO: 3.67 K/UL — SIGNIFICANT CHANGE UP (ref 1.8–7.4)
NEUTROPHILS NFR BLD AUTO: 66.1 % — SIGNIFICANT CHANGE UP (ref 43–77)
NRBC # BLD: 0 /100 WBCS — SIGNIFICANT CHANGE UP (ref 0–0)
PLATELET # BLD AUTO: 258 K/UL — SIGNIFICANT CHANGE UP (ref 150–400)
POTASSIUM SERPL-MCNC: 4.5 MMOL/L — SIGNIFICANT CHANGE UP (ref 3.5–5.3)
POTASSIUM SERPL-SCNC: 4.5 MMOL/L — SIGNIFICANT CHANGE UP (ref 3.5–5.3)
PROTHROM AB SERPL-ACNC: 11 SEC — SIGNIFICANT CHANGE UP (ref 10–12.9)
RBC # BLD: 5.57 M/UL — SIGNIFICANT CHANGE UP (ref 4.2–5.8)
RBC # FLD: 14.4 % — SIGNIFICANT CHANGE UP (ref 10.3–14.5)
SODIUM SERPL-SCNC: 139 MMOL/L — SIGNIFICANT CHANGE UP (ref 135–145)
WBC # BLD: 5.55 K/UL — SIGNIFICANT CHANGE UP (ref 3.8–10.5)
WBC # FLD AUTO: 5.55 K/UL — SIGNIFICANT CHANGE UP (ref 3.8–10.5)

## 2019-06-14 PROCEDURE — 93010 ELECTROCARDIOGRAM REPORT: CPT

## 2019-06-14 RX ORDER — ESZOPICLONE 2 MG/1
1 TABLET, COATED ORAL
Qty: 0 | Refills: 0 | DISCHARGE

## 2019-06-14 RX ORDER — SODIUM CHLORIDE 9 MG/ML
1000 INJECTION, SOLUTION INTRAVENOUS
Refills: 0 | Status: DISCONTINUED | OUTPATIENT
Start: 2019-06-19 | End: 2019-06-19

## 2019-06-14 RX ORDER — OXYCODONE HYDROCHLORIDE 5 MG/1
1 TABLET ORAL
Qty: 0 | Refills: 0 | DISCHARGE

## 2019-06-14 RX ORDER — SODIUM CHLORIDE 9 MG/ML
3 INJECTION INTRAMUSCULAR; INTRAVENOUS; SUBCUTANEOUS EVERY 8 HOURS
Refills: 0 | Status: DISCONTINUED | OUTPATIENT
Start: 2019-06-19 | End: 2019-06-19

## 2019-06-14 RX ORDER — PHENTERMINE HCL 30 MG
1 CAPSULE ORAL
Qty: 0 | Refills: 0 | DISCHARGE

## 2019-06-14 RX ORDER — TADALAFIL 10 MG/1
1 TABLET, FILM COATED ORAL
Qty: 0 | Refills: 0 | DISCHARGE

## 2019-06-14 NOTE — H&P PST ADULT - NEGATIVE CARDIOVASCULAR SYMPTOMS
no palpitations/no chest pain/no claudication/no orthopnea/no paroxysmal nocturnal dyspnea/no dyspnea on exertion/no peripheral edema

## 2019-06-14 NOTE — H&P PST ADULT - NSANTHOSAYNRD_GEN_A_CORE
No. AMBREEN screening performed.  STOP BANG Legend: 0-2 = LOW Risk; 3-4 = INTERMEDIATE Risk; 5-8 = HIGH Risk

## 2019-06-14 NOTE — H&P PST ADULT - FALLEN IN THE PAST
yes/pt reports he fell late 5/2019 due to knee problems and injuried right hand and underwent surgical procedure

## 2019-06-14 NOTE — PHYSICAL THERAPY INITIAL EVALUATION ADULT - GAIT DEVIATIONS NOTED, PT EVAL
decreased velocity of limb motion/decreased stride length/decreased ange/decreased step length/decreased weight-shifting ability

## 2019-06-14 NOTE — H&P PST ADULT - NS MD HP INPLANTS MED DEV
Left knee cadaver joint and screws Left knee cadaver joint and screws, right hand screws, spinal fusion with hardware/Artificial joint

## 2019-06-14 NOTE — PHYSICAL THERAPY INITIAL EVALUATION ADULT - ADDITIONAL COMMENTS
There is 3 steps w/o rail at the front entry and one step, c door frame to hold at the back entry. There is 11 steps c L rail up to negotiate at home. Pt may stay on the main floor if needed.   Pt has a tub/shower combo and a walk-in shower c comfort toilet seat in . Pt is R handed and drives. Pt has a straight cane and rolling walker and they are in good condition. Pt wears R knee brace all the time for support. Wife will be available to assist pt in post -op care upon discharge home.

## 2019-06-14 NOTE — PHYSICAL THERAPY INITIAL EVALUATION ADULT - CRITERIA FOR SKILLED THERAPEUTIC INTERVENTIONS
risk reduction/prevention/impairments found/anticipated discharge recommendation/functional limitations in following categories/anticipated equipment needs at discharge/rehab potential/therapy frequency

## 2019-06-14 NOTE — H&P PST ADULT - NSICDXFAMILYHX_GEN_ALL_CORE_FT
FAMILY HISTORY:  Father  Still living? Yes, Estimated age: Age Unknown  Family history of diabetes mellitus, Age at diagnosis: Age Unknown    Sibling  Still living? Yes, Estimated age: Age Unknown  Family history of diabetes mellitus, Age at diagnosis: Age Unknown

## 2019-06-14 NOTE — H&P PST ADULT - MUSCULOSKELETAL
details… detailed exam normal strength/no joint erythema/no joint warmth/no calf tenderness/no joint swelling/decreased ROM due to pain

## 2019-06-14 NOTE — H&P PST ADULT - NSICDXPASTSURGICALHX_GEN_ALL_CORE_FT
PAST SURGICAL HISTORY:  H/O colonoscopy (2013)    H/O hand surgery right hand 5/2019 for a fall/injury - screws implant    H/O inguinal hernia repair right (1998)    H/O total knee replacement (1998) multiple other knee surgeries    History of tonsillectomy (1975)

## 2019-06-14 NOTE — H&P PST ADULT - NEGATIVE NEUROLOGICAL SYMPTOMS
no weakness/no syncope/no focal seizures/no generalized seizures/no tremors/no transient paralysis/no paresthesias

## 2019-06-14 NOTE — H&P PST ADULT - NSICDXPROBLEM_GEN_ALL_CORE_FT
PROBLEM DIAGNOSES  Problem: Unilateral primary osteoarthritis, right knee  Assessment and Plan: Right Total Knee Arthroplasty scheduled for 6/19/2019  Pre-op instructions given. Pt verbalized understanding  Chlorhexidine wash instructions given  Pending: Medical clearance - Abnormal EKG + requested by surgeon     Problem: Hypertension  Assessment and Plan: AMBREEN precaution - stop bang 3  Pt instructed to take meds as prescribed      Problem: Back pain  Assessment and Plan: Pt instructed to take meds as prescribed

## 2019-06-14 NOTE — H&P PST ADULT - HISTORY OF PRESENT ILLNESS
49 year old white male who states that he had a left knee injury almost 20 years ago since then he has pain in left knee and had 4 surgeries done including a cadaver joint replacement. c/o pain while climbing the stairs. 54yo male with medical h/o HTN, Chronic back pain and HTN, c/o OA,  right knee. Pt presents today for presurgical testing for Right Total Knee Arthroplasty scheduled for 6/19/2019

## 2019-06-14 NOTE — PHYSICAL THERAPY INITIAL EVALUATION ADULT - IMPAIRMENTS FOUND, PT EVAL
gait, locomotion, and balance/muscle strength/posture/ergonomics and body mechanics/aerobic capacity/endurance

## 2019-06-14 NOTE — H&P PST ADULT - NEGATIVE MUSCULOSKELETAL SYMPTOMS
no myalgia/no arm pain L/no arm pain R/no neck pain/no leg pain R/no muscle weakness/no joint swelling/no muscle cramps/no arthralgia

## 2019-06-15 LAB
HBA1C BLD-MCNC: 5 % — SIGNIFICANT CHANGE UP (ref 4–5.6)
MRSA PCR RESULT.: SIGNIFICANT CHANGE UP
S AUREUS DNA NOSE QL NAA+PROBE: DETECTED

## 2019-06-18 ENCOUNTER — TRANSCRIPTION ENCOUNTER (OUTPATIENT)
Age: 54
End: 2019-06-18

## 2019-06-18 RX ORDER — MUPIROCIN 20 MG/G
1 OINTMENT TOPICAL
Qty: 1 | Refills: 0
Start: 2019-06-18 | End: 2019-06-22

## 2019-06-19 ENCOUNTER — RESULT REVIEW (OUTPATIENT)
Age: 54
End: 2019-06-19

## 2019-06-19 ENCOUNTER — TRANSCRIPTION ENCOUNTER (OUTPATIENT)
Age: 54
End: 2019-06-19

## 2019-06-19 ENCOUNTER — INPATIENT (INPATIENT)
Facility: HOSPITAL | Age: 54
LOS: 0 days | Discharge: HOME HEALTH | End: 2019-06-20
Attending: ORTHOPAEDIC SURGERY | Admitting: ORTHOPAEDIC SURGERY
Payer: OTHER MISCELLANEOUS

## 2019-06-19 VITALS
TEMPERATURE: 98 F | SYSTOLIC BLOOD PRESSURE: 131 MMHG | RESPIRATION RATE: 16 BRPM | HEART RATE: 54 BPM | HEIGHT: 70 IN | DIASTOLIC BLOOD PRESSURE: 83 MMHG | WEIGHT: 220.46 LBS | OXYGEN SATURATION: 99 %

## 2019-06-19 DIAGNOSIS — Z98.89 OTHER SPECIFIED POSTPROCEDURAL STATES: Chronic | ICD-10-CM

## 2019-06-19 DIAGNOSIS — I10 ESSENTIAL (PRIMARY) HYPERTENSION: ICD-10-CM

## 2019-06-19 DIAGNOSIS — Z96.659 PRESENCE OF UNSPECIFIED ARTIFICIAL KNEE JOINT: Chronic | ICD-10-CM

## 2019-06-19 DIAGNOSIS — Z98.890 OTHER SPECIFIED POSTPROCEDURAL STATES: Chronic | ICD-10-CM

## 2019-06-19 LAB
AMPHET UR-MCNC: NEGATIVE — SIGNIFICANT CHANGE UP
BARBITURATES UR SCN-MCNC: NEGATIVE — SIGNIFICANT CHANGE UP
BENZODIAZ UR-MCNC: POSITIVE — SIGNIFICANT CHANGE UP
COCAINE METAB.OTHER UR-MCNC: NEGATIVE — SIGNIFICANT CHANGE UP
HCT VFR BLD CALC: 44.8 % — SIGNIFICANT CHANGE UP (ref 39–50)
HGB BLD-MCNC: 14 G/DL — SIGNIFICANT CHANGE UP (ref 13–17)
MCHC RBC-ENTMCNC: 26.1 PG — LOW (ref 27–34)
MCHC RBC-ENTMCNC: 31.3 GM/DL — LOW (ref 32–36)
MCV RBC AUTO: 83.6 FL — SIGNIFICANT CHANGE UP (ref 80–100)
METHADONE UR-MCNC: NEGATIVE — SIGNIFICANT CHANGE UP
NRBC # BLD: 0 /100 WBCS — SIGNIFICANT CHANGE UP (ref 0–0)
OPIATES UR-MCNC: POSITIVE — SIGNIFICANT CHANGE UP
PCP SPEC-MCNC: SIGNIFICANT CHANGE UP
PCP UR-MCNC: NEGATIVE — SIGNIFICANT CHANGE UP
PLATELET # BLD AUTO: 214 K/UL — SIGNIFICANT CHANGE UP (ref 150–400)
RBC # BLD: 5.36 M/UL — SIGNIFICANT CHANGE UP (ref 4.2–5.8)
RBC # FLD: 14.3 % — SIGNIFICANT CHANGE UP (ref 10.3–14.5)
THC UR QL: NEGATIVE — SIGNIFICANT CHANGE UP
WBC # BLD: 6.42 K/UL — SIGNIFICANT CHANGE UP (ref 3.8–10.5)
WBC # FLD AUTO: 6.42 K/UL — SIGNIFICANT CHANGE UP (ref 3.8–10.5)

## 2019-06-19 PROCEDURE — 73560 X-RAY EXAM OF KNEE 1 OR 2: CPT | Mod: 26,RT

## 2019-06-19 PROCEDURE — 88305 TISSUE EXAM BY PATHOLOGIST: CPT | Mod: 26

## 2019-06-19 PROCEDURE — 88311 DECALCIFY TISSUE: CPT | Mod: 26

## 2019-06-19 RX ORDER — ACETAMINOPHEN 500 MG
975 TABLET ORAL EVERY 8 HOURS
Refills: 0 | Status: DISCONTINUED | OUTPATIENT
Start: 2019-06-19 | End: 2019-06-19

## 2019-06-19 RX ORDER — OXYCODONE HYDROCHLORIDE 5 MG/1
5 TABLET ORAL EVERY 4 HOURS
Refills: 0 | Status: DISCONTINUED | OUTPATIENT
Start: 2019-06-19 | End: 2019-06-19

## 2019-06-19 RX ORDER — ONDANSETRON 8 MG/1
4 TABLET, FILM COATED ORAL EVERY 6 HOURS
Refills: 0 | Status: DISCONTINUED | OUTPATIENT
Start: 2019-06-19 | End: 2019-06-20

## 2019-06-19 RX ORDER — DEXAMETHASONE 0.5 MG/5ML
10 ELIXIR ORAL ONCE
Refills: 0 | Status: COMPLETED | OUTPATIENT
Start: 2019-06-20 | End: 2019-06-20

## 2019-06-19 RX ORDER — PERINDOPRIL ERBUMINE 8 MG/1
1 TABLET ORAL
Qty: 0 | Refills: 0 | DISCHARGE

## 2019-06-19 RX ORDER — ASPIRIN/CALCIUM CARB/MAGNESIUM 324 MG
325 TABLET ORAL EVERY 12 HOURS
Refills: 0 | Status: DISCONTINUED | OUTPATIENT
Start: 2019-06-20 | End: 2019-06-20

## 2019-06-19 RX ORDER — SODIUM CHLORIDE 9 MG/ML
1000 INJECTION INTRAMUSCULAR; INTRAVENOUS; SUBCUTANEOUS
Refills: 0 | Status: DISCONTINUED | OUTPATIENT
Start: 2019-06-19 | End: 2019-06-20

## 2019-06-19 RX ORDER — PANTOPRAZOLE SODIUM 20 MG/1
40 TABLET, DELAYED RELEASE ORAL
Refills: 0 | Status: DISCONTINUED | OUTPATIENT
Start: 2019-06-19 | End: 2019-06-20

## 2019-06-19 RX ORDER — POLYETHYLENE GLYCOL 3350 17 G/17G
17 POWDER, FOR SOLUTION ORAL DAILY
Refills: 0 | Status: DISCONTINUED | OUTPATIENT
Start: 2019-06-19 | End: 2019-06-20

## 2019-06-19 RX ORDER — OXYCODONE HYDROCHLORIDE 5 MG/1
10 TABLET ORAL EVERY 4 HOURS
Refills: 0 | Status: DISCONTINUED | OUTPATIENT
Start: 2019-06-19 | End: 2019-06-19

## 2019-06-19 RX ORDER — SODIUM CHLORIDE 9 MG/ML
1000 INJECTION, SOLUTION INTRAVENOUS
Refills: 0 | Status: DISCONTINUED | OUTPATIENT
Start: 2019-06-19 | End: 2019-06-19

## 2019-06-19 RX ORDER — SENNA PLUS 8.6 MG/1
2 TABLET ORAL AT BEDTIME
Refills: 0 | Status: DISCONTINUED | OUTPATIENT
Start: 2019-06-19 | End: 2019-06-20

## 2019-06-19 RX ORDER — HYDROMORPHONE HYDROCHLORIDE 2 MG/ML
1 INJECTION INTRAMUSCULAR; INTRAVENOUS; SUBCUTANEOUS
Refills: 0 | Status: DISCONTINUED | OUTPATIENT
Start: 2019-06-19 | End: 2019-06-20

## 2019-06-19 RX ORDER — ACETAMINOPHEN 500 MG
975 TABLET ORAL EVERY 8 HOURS
Refills: 0 | Status: DISCONTINUED | OUTPATIENT
Start: 2019-06-19 | End: 2019-06-20

## 2019-06-19 RX ORDER — HYDROMORPHONE HYDROCHLORIDE 2 MG/ML
0.5 INJECTION INTRAMUSCULAR; INTRAVENOUS; SUBCUTANEOUS EVERY 4 HOURS
Refills: 0 | Status: DISCONTINUED | OUTPATIENT
Start: 2019-06-19 | End: 2019-06-19

## 2019-06-19 RX ORDER — ASCORBIC ACID 60 MG
500 TABLET,CHEWABLE ORAL
Refills: 0 | Status: DISCONTINUED | OUTPATIENT
Start: 2019-06-19 | End: 2019-06-20

## 2019-06-19 RX ORDER — OXYCODONE HYDROCHLORIDE 5 MG/1
15 TABLET ORAL EVERY 4 HOURS
Refills: 0 | Status: DISCONTINUED | OUTPATIENT
Start: 2019-06-19 | End: 2019-06-20

## 2019-06-19 RX ORDER — TRANEXAMIC ACID 100 MG/ML
1000 INJECTION, SOLUTION INTRAVENOUS ONCE
Refills: 0 | Status: COMPLETED | OUTPATIENT
Start: 2019-06-19 | End: 2019-06-19

## 2019-06-19 RX ORDER — CELECOXIB 200 MG/1
200 CAPSULE ORAL EVERY 12 HOURS
Refills: 0 | Status: DISCONTINUED | OUTPATIENT
Start: 2019-06-20 | End: 2019-06-20

## 2019-06-19 RX ORDER — HYDROMORPHONE HYDROCHLORIDE 2 MG/ML
1 INJECTION INTRAMUSCULAR; INTRAVENOUS; SUBCUTANEOUS
Refills: 0 | Status: DISCONTINUED | OUTPATIENT
Start: 2019-06-19 | End: 2019-06-19

## 2019-06-19 RX ORDER — DOCUSATE SODIUM 100 MG
100 CAPSULE ORAL THREE TIMES A DAY
Refills: 0 | Status: DISCONTINUED | OUTPATIENT
Start: 2019-06-19 | End: 2019-06-20

## 2019-06-19 RX ORDER — FOLIC ACID 0.8 MG
1 TABLET ORAL DAILY
Refills: 0 | Status: DISCONTINUED | OUTPATIENT
Start: 2019-06-19 | End: 2019-06-20

## 2019-06-19 RX ORDER — OXYCODONE HYDROCHLORIDE 5 MG/1
15 TABLET ORAL ONCE
Refills: 0 | Status: DISCONTINUED | OUTPATIENT
Start: 2019-06-19 | End: 2019-06-19

## 2019-06-19 RX ORDER — ACETAMINOPHEN 500 MG
1000 TABLET ORAL ONCE
Refills: 0 | Status: DISCONTINUED | OUTPATIENT
Start: 2019-06-19 | End: 2019-06-20

## 2019-06-19 RX ORDER — OXYCODONE HYDROCHLORIDE 5 MG/1
20 TABLET ORAL ONCE
Refills: 0 | Status: DISCONTINUED | OUTPATIENT
Start: 2019-06-19 | End: 2019-06-19

## 2019-06-19 RX ORDER — ACETAMINOPHEN 500 MG
650 TABLET ORAL ONCE
Refills: 0 | Status: COMPLETED | OUTPATIENT
Start: 2019-06-19 | End: 2019-06-19

## 2019-06-19 RX ORDER — FENTANYL CITRATE 50 UG/ML
25 INJECTION INTRAVENOUS
Refills: 0 | Status: DISCONTINUED | OUTPATIENT
Start: 2019-06-19 | End: 2019-06-19

## 2019-06-19 RX ORDER — CEFAZOLIN SODIUM 1 G
2000 VIAL (EA) INJECTION EVERY 8 HOURS
Refills: 0 | Status: COMPLETED | OUTPATIENT
Start: 2019-06-19 | End: 2019-06-20

## 2019-06-19 RX ORDER — OXYCODONE HYDROCHLORIDE 5 MG/1
20 TABLET ORAL EVERY 4 HOURS
Refills: 0 | Status: DISCONTINUED | OUTPATIENT
Start: 2019-06-19 | End: 2019-06-20

## 2019-06-19 RX ORDER — ALPRAZOLAM 0.25 MG
1 TABLET ORAL
Qty: 0 | Refills: 0 | DISCHARGE

## 2019-06-19 RX ORDER — LISINOPRIL 2.5 MG/1
20 TABLET ORAL DAILY
Refills: 0 | Status: DISCONTINUED | OUTPATIENT
Start: 2019-06-19 | End: 2019-06-20

## 2019-06-19 RX ORDER — MAGNESIUM HYDROXIDE 400 MG/1
30 TABLET, CHEWABLE ORAL DAILY
Refills: 0 | Status: DISCONTINUED | OUTPATIENT
Start: 2019-06-19 | End: 2019-06-20

## 2019-06-19 RX ADMIN — OXYCODONE HYDROCHLORIDE 10 MILLIGRAM(S): 5 TABLET ORAL at 20:07

## 2019-06-19 RX ADMIN — OXYCODONE HYDROCHLORIDE 20 MILLIGRAM(S): 5 TABLET ORAL at 23:47

## 2019-06-19 RX ADMIN — OXYCODONE HYDROCHLORIDE 10 MILLIGRAM(S): 5 TABLET ORAL at 19:07

## 2019-06-19 RX ADMIN — OXYCODONE HYDROCHLORIDE 20 MILLIGRAM(S): 5 TABLET ORAL at 12:07

## 2019-06-19 RX ADMIN — Medication 650 MILLIGRAM(S): at 12:07

## 2019-06-19 RX ADMIN — HYDROMORPHONE HYDROCHLORIDE 1 MILLIGRAM(S): 2 INJECTION INTRAMUSCULAR; INTRAVENOUS; SUBCUTANEOUS at 21:30

## 2019-06-19 RX ADMIN — HYDROMORPHONE HYDROCHLORIDE 1 MILLIGRAM(S): 2 INJECTION INTRAMUSCULAR; INTRAVENOUS; SUBCUTANEOUS at 21:45

## 2019-06-19 RX ADMIN — SODIUM CHLORIDE 100 MILLILITER(S): 9 INJECTION INTRAMUSCULAR; INTRAVENOUS; SUBCUTANEOUS at 18:30

## 2019-06-19 RX ADMIN — Medication 100 MILLIGRAM(S): at 21:16

## 2019-06-19 RX ADMIN — TRANEXAMIC ACID 220 MILLIGRAM(S): 100 INJECTION, SOLUTION INTRAVENOUS at 16:55

## 2019-06-19 RX ADMIN — Medication 100 MILLIGRAM(S): at 22:34

## 2019-06-19 RX ADMIN — SODIUM CHLORIDE 100 MILLILITER(S): 9 INJECTION, SOLUTION INTRAVENOUS at 16:53

## 2019-06-19 RX ADMIN — Medication 150 MILLIGRAM(S): at 21:16

## 2019-06-19 NOTE — DISCHARGE NOTE PROVIDER - HOSPITAL COURSE
53yMale with history of Right Knee Pain presenting for Right TKA by Dr. Chappell on 6/19/19. Risk and benefits of surgery were explained to the patient. The patient understood and agreed to proceed with surgery. Patient underwent the procedure with no intraoperative complications. Pt was brought in stable condition to the PACU. Once stable in PACU, pt was brought to the floor. During hospital stay pt was followed by Medicine during this admission. Pt had an uneventful hospital course. Pt is stable for discharge to [rehab/home] on POD#[ ] 53yMale with history of Right Knee Pain presenting for Right TKA by Dr. Chappell on 6/19/19. Risk and benefits of surgery were explained to the patient. The patient understood and agreed to proceed with surgery. Patient underwent the procedure with no intraoperative complications. Pt was brought in stable condition to the PACU. Once stable in PACU, pt was brought to the floor. During hospital stay pt was followed by Medicine during this admission. Pt had an uneventful hospital course. Pt is stable for discharge to home on POD#1

## 2019-06-19 NOTE — PROGRESS NOTE ADULT - SUBJECTIVE AND OBJECTIVE BOX
Pt complaining of pain s/p R TKA. Pt admits taking oxycodone 15mg q4h daily due to history of back pain. Pt pain medications adjusted to oxycodone 15mg/20mg q4H as needed for pain depending on pain scale. Will continue to monitor pt.

## 2019-06-19 NOTE — DISCHARGE NOTE PROVIDER - NSDCCPCAREPLAN_GEN_ALL_CORE_FT
PRINCIPAL DISCHARGE DIAGNOSIS  Diagnosis: Primary osteoarthritis of right knee  Assessment and Plan of Treatment:

## 2019-06-19 NOTE — DISCHARGE NOTE PROVIDER - CARE PROVIDER_API CALL
Graeme Chappell)  Orthopaedic Surgery  [address] [place] [address]  Phone: [phone]  Fax: [phone]  Follow Up Time:

## 2019-06-19 NOTE — CONSULT NOTE ADULT - SUBJECTIVE AND OBJECTIVE BOX
BINA ARDON is a 53y Male s/p RIGHT TOTAL KNEE ARTHROPLASTY  by Dr. Chappell on 6/19/19. complains of postop pain; patient tolerated surgery well.    PMH:  w/ h/o Back pain  Arthritis  Hypertension    ROS: no fevers, chills, headache, dizziness, lightheadedness, chest pain, palpitations, shortness of breath, cough, phlegm, wheezing, abdominal pain, nausea, vomiting, diarrhea, constipation or urinary symptoms     PSH:  H/O hand surgery  H/O colonoscopy  History of tonsillectomy  H/O inguinal hernia repair  H/O total knee replacement    Family history of diabetes mellitus (Father, Sibling)    SH: does not smoke or drink at this time    No Known Allergies    MEDS:  acetaminophen   Tablet .. 975 milliGRAM(s) Oral every 8 hours  acetaminophen  IVPB .. 1000 milliGRAM(s) IV Intermittent once  aluminum hydroxide/magnesium hydroxide/simethicone Suspension 30 milliLiter(s) Oral four times a day PRN  ascorbic acid 500 milliGRAM(s) Oral two times a day  ceFAZolin   IVPB 2000 milliGRAM(s) IV Intermittent every 8 hours  docusate sodium 100 milliGRAM(s) Oral three times a day  folic acid 1 milliGRAM(s) Oral daily  HYDROmorphone  Injectable 1 milliGRAM(s) IV Push every 3 hours PRN  lisinopril 20 milliGRAM(s) Oral daily  magnesium hydroxide Suspension 30 milliLiter(s) Oral daily PRN  multivitamin 1 Tablet(s) Oral daily  ondansetron Injectable 4 milliGRAM(s) IV Push every 6 hours PRN  oxyCODONE    IR 15 milliGRAM(s) Oral every 4 hours PRN  oxyCODONE    IR 20 milliGRAM(s) Oral every 4 hours PRN  pantoprazole    Tablet 40 milliGRAM(s) Oral before breakfast  polyethylene glycol 3350 17 Gram(s) Oral daily  pregabalin 150 milliGRAM(s) Oral two times a day  senna 2 Tablet(s) Oral at bedtime PRN  sodium chloride 0.9%. 1000 milliLiter(s) IV Continuous <Continuous>    PHYS:  T(C): 36.7 (06-19-19 @ 20:20), Max: 36.8 (06-19-19 @ 11:56)  HR: 71 (06-19-19 @ 20:20) (52 - 71)  BP: 135/72 (06-19-19 @ 20:20) (120/72 - 135/72)  RR: 18 (06-19-19 @ 20:20) (14 - 18)  SpO2: 95% (06-19-19 @ 20:20) (95% - 100%)  HEENT unremarkable  neck no JVD or bruit  lungs, clear bilaterally   heart, regular rhythm, normal S1, S2, no murmurs, rubs or gallops   abdomen, soft, non tender, no organomegaly, normal bowel sounds   no cyanosis, clubbing, edema or calf tenderness  neuro, grossly normal                        14.0   6.42  )-----------( 214      ( 19 Jun 2019 17:17 )             44.8       Assessment and Plan: status post right total knee replacement; Hypertension; Chronic Kidney Disease 3;  obesity (BMI=31.6); pain control; deep vein thrombophlebitis prophylaxis; physical therapy;   bowel regimen; nutrition support; follow up labs; will follow.

## 2019-06-19 NOTE — DISCHARGE NOTE PROVIDER - NSDCACTIVITY_GEN_ALL_CORE
Walking - Indoors allowed/No heavy lifting/straining/Walking - Outdoors allowed/Stairs allowed/Do not drive or operate machinery/Showering allowed

## 2019-06-19 NOTE — PROGRESS NOTE ADULT - SUBJECTIVE AND OBJECTIVE BOX
Post-op Check   POD#0 S/P Right TKA   53yMale Patient seen and examined, Pain controlled  Patient Denies SOB, CP, N/V/D       PE: Right Knee/LE: Dressing C/D/I, Sensation/motor intact, DP 2+, Moving ankle/toes   B/L LE: Skin intact. +ROM hip/knee/ankle/toes. Ankle Dorsi/plantarflexion: 5/5. Calf: soft, compressible and nontender. DP/PT 2+ NVI                          14.0   6.42  )-----------( 214      ( 19 Jun 2019 17:17 )             44.8                 A: As above   P: Pain Control       DVT Prophylaxis      Incentive spirometry      PT WBAT RLE      Isometric exercises      Discharge Planning      All the above discussed and understood by pt       Ortho to F/U

## 2019-06-20 ENCOUNTER — TRANSCRIPTION ENCOUNTER (OUTPATIENT)
Age: 54
End: 2019-06-20

## 2019-06-20 VITALS
SYSTOLIC BLOOD PRESSURE: 151 MMHG | OXYGEN SATURATION: 95 % | RESPIRATION RATE: 16 BRPM | DIASTOLIC BLOOD PRESSURE: 79 MMHG | HEART RATE: 60 BPM | TEMPERATURE: 98 F

## 2019-06-20 LAB
ANION GAP SERPL CALC-SCNC: 8 MMOL/L — SIGNIFICANT CHANGE UP (ref 5–17)
BUN SERPL-MCNC: 21 MG/DL — SIGNIFICANT CHANGE UP (ref 7–23)
CALCIUM SERPL-MCNC: 8.5 MG/DL — SIGNIFICANT CHANGE UP (ref 8.5–10.1)
CHLORIDE SERPL-SCNC: 104 MMOL/L — SIGNIFICANT CHANGE UP (ref 96–108)
CO2 SERPL-SCNC: 27 MMOL/L — SIGNIFICANT CHANGE UP (ref 22–31)
CREAT SERPL-MCNC: 1.08 MG/DL — SIGNIFICANT CHANGE UP (ref 0.5–1.3)
GLUCOSE SERPL-MCNC: 120 MG/DL — HIGH (ref 70–99)
HCT VFR BLD CALC: 46.7 % — SIGNIFICANT CHANGE UP (ref 39–50)
HGB BLD-MCNC: 14.7 G/DL — SIGNIFICANT CHANGE UP (ref 13–17)
MCHC RBC-ENTMCNC: 25.8 PG — LOW (ref 27–34)
MCHC RBC-ENTMCNC: 31.5 GM/DL — LOW (ref 32–36)
MCV RBC AUTO: 82.1 FL — SIGNIFICANT CHANGE UP (ref 80–100)
NRBC # BLD: 0 /100 WBCS — SIGNIFICANT CHANGE UP (ref 0–0)
PLATELET # BLD AUTO: 243 K/UL — SIGNIFICANT CHANGE UP (ref 150–400)
POTASSIUM SERPL-MCNC: 4.7 MMOL/L — SIGNIFICANT CHANGE UP (ref 3.5–5.3)
POTASSIUM SERPL-SCNC: 4.7 MMOL/L — SIGNIFICANT CHANGE UP (ref 3.5–5.3)
RBC # BLD: 5.69 M/UL — SIGNIFICANT CHANGE UP (ref 4.2–5.8)
RBC # FLD: 13.9 % — SIGNIFICANT CHANGE UP (ref 10.3–14.5)
SODIUM SERPL-SCNC: 139 MMOL/L — SIGNIFICANT CHANGE UP (ref 135–145)
WBC # BLD: 13.4 K/UL — HIGH (ref 3.8–10.5)
WBC # FLD AUTO: 13.4 K/UL — HIGH (ref 3.8–10.5)

## 2019-06-20 RX ORDER — OXYCODONE HYDROCHLORIDE 5 MG/1
1 TABLET ORAL
Qty: 30 | Refills: 0
Start: 2019-06-20 | End: 2019-06-24

## 2019-06-20 RX ORDER — PANTOPRAZOLE SODIUM 20 MG/1
1 TABLET, DELAYED RELEASE ORAL
Qty: 30 | Refills: 0
Start: 2019-06-20 | End: 2019-07-19

## 2019-06-20 RX ORDER — POLYETHYLENE GLYCOL 3350 17 G/17G
17 POWDER, FOR SOLUTION ORAL
Qty: 0 | Refills: 0 | DISCHARGE
Start: 2019-06-20

## 2019-06-20 RX ORDER — OXYCODONE HYDROCHLORIDE 5 MG/1
1 TABLET ORAL
Qty: 0 | Refills: 0 | DISCHARGE

## 2019-06-20 RX ORDER — CELECOXIB 200 MG/1
1 CAPSULE ORAL
Qty: 60 | Refills: 0
Start: 2019-06-20 | End: 2019-07-19

## 2019-06-20 RX ORDER — DOCUSATE SODIUM 100 MG
1 CAPSULE ORAL
Qty: 0 | Refills: 0 | DISCHARGE
Start: 2019-06-20

## 2019-06-20 RX ORDER — ACETAMINOPHEN 500 MG
3 TABLET ORAL
Qty: 0 | Refills: 0 | DISCHARGE
Start: 2019-06-20

## 2019-06-20 RX ORDER — ASCORBIC ACID 60 MG
1 TABLET,CHEWABLE ORAL
Qty: 0 | Refills: 0 | DISCHARGE
Start: 2019-06-20

## 2019-06-20 RX ORDER — ASPIRIN/CALCIUM CARB/MAGNESIUM 324 MG
1 TABLET ORAL
Qty: 60 | Refills: 0
Start: 2019-06-20 | End: 2019-07-19

## 2019-06-20 RX ORDER — MAGNESIUM HYDROXIDE 400 MG/1
30 TABLET, CHEWABLE ORAL
Qty: 0 | Refills: 0 | DISCHARGE
Start: 2019-06-20

## 2019-06-20 RX ADMIN — HYDROMORPHONE HYDROCHLORIDE 1 MILLIGRAM(S): 2 INJECTION INTRAMUSCULAR; INTRAVENOUS; SUBCUTANEOUS at 02:57

## 2019-06-20 RX ADMIN — Medication 1 TABLET(S): at 09:39

## 2019-06-20 RX ADMIN — Medication 975 MILLIGRAM(S): at 06:23

## 2019-06-20 RX ADMIN — OXYCODONE HYDROCHLORIDE 20 MILLIGRAM(S): 5 TABLET ORAL at 09:40

## 2019-06-20 RX ADMIN — CELECOXIB 200 MILLIGRAM(S): 200 CAPSULE ORAL at 05:37

## 2019-06-20 RX ADMIN — OXYCODONE HYDROCHLORIDE 20 MILLIGRAM(S): 5 TABLET ORAL at 13:42

## 2019-06-20 RX ADMIN — LISINOPRIL 20 MILLIGRAM(S): 2.5 TABLET ORAL at 05:37

## 2019-06-20 RX ADMIN — Medication 500 MILLIGRAM(S): at 05:37

## 2019-06-20 RX ADMIN — Medication 100 MILLIGRAM(S): at 05:37

## 2019-06-20 RX ADMIN — Medication 975 MILLIGRAM(S): at 05:37

## 2019-06-20 RX ADMIN — PANTOPRAZOLE SODIUM 40 MILLIGRAM(S): 20 TABLET, DELAYED RELEASE ORAL at 07:35

## 2019-06-20 RX ADMIN — Medication 1 MILLIGRAM(S): at 09:39

## 2019-06-20 RX ADMIN — SODIUM CHLORIDE 100 MILLILITER(S): 9 INJECTION INTRAMUSCULAR; INTRAVENOUS; SUBCUTANEOUS at 07:35

## 2019-06-20 RX ADMIN — OXYCODONE HYDROCHLORIDE 20 MILLIGRAM(S): 5 TABLET ORAL at 05:36

## 2019-06-20 RX ADMIN — Medication 150 MILLIGRAM(S): at 05:36

## 2019-06-20 RX ADMIN — OXYCODONE HYDROCHLORIDE 20 MILLIGRAM(S): 5 TABLET ORAL at 06:23

## 2019-06-20 RX ADMIN — OXYCODONE HYDROCHLORIDE 20 MILLIGRAM(S): 5 TABLET ORAL at 10:27

## 2019-06-20 RX ADMIN — POLYETHYLENE GLYCOL 3350 17 GRAM(S): 17 POWDER, FOR SOLUTION ORAL at 09:39

## 2019-06-20 RX ADMIN — Medication 102 MILLIGRAM(S): at 05:37

## 2019-06-20 RX ADMIN — OXYCODONE HYDROCHLORIDE 20 MILLIGRAM(S): 5 TABLET ORAL at 14:40

## 2019-06-20 RX ADMIN — Medication 975 MILLIGRAM(S): at 14:00

## 2019-06-20 RX ADMIN — CELECOXIB 200 MILLIGRAM(S): 200 CAPSULE ORAL at 06:23

## 2019-06-20 RX ADMIN — Medication 975 MILLIGRAM(S): at 13:05

## 2019-06-20 RX ADMIN — OXYCODONE HYDROCHLORIDE 20 MILLIGRAM(S): 5 TABLET ORAL at 00:47

## 2019-06-20 RX ADMIN — Medication 325 MILLIGRAM(S): at 05:37
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
## 2019-06-20 NOTE — PHYSICAL THERAPY INITIAL EVALUATION ADULT - IMPAIRMENTS FOUND, PT EVAL
ergonomics and body mechanics/ROM/gross motor/muscle strength/gait, locomotion, and balance/integumentary integrity

## 2019-06-20 NOTE — PROGRESS NOTE ADULT - SUBJECTIVE AND OBJECTIVE BOX
BINA ARDON is a 53y Male s/p RIGHT TOTAL KNEE ARTHROPLASTY      denies any chest pain shortness of breath palpitation dizziness lightheadedness nausea vomiting fever or chills    T(C): 37.1 (06-20-19 @ 09:30), Max: 37.1 (06-20-19 @ 09:30)  HR: 64 (06-20-19 @ 10:30) (52 - 71)  BP: 155/90 (06-20-19 @ 10:30) (120/72 - 155/90)  RR: 17 (06-20-19 @ 09:30) (14 - 18)  SpO2: 97% (06-20-19 @ 09:40) (94% - 100%)  no jvd/bruit  s1 s2 rrr  cta  s/nt/nd  no calf tend                        14.7   13.40 )-----------( 243      ( 20 Jun 2019 07:25 )             46.7   06-20    139  |  104  |  21  ----------------------------<  120<H>  4.7   |  27  |  1.08    Ca    8.5      20 Jun 2019 07:25        cont dvt px  pain control  bowel regimen  antiemetics  incentive spirometer

## 2019-06-20 NOTE — OCCUPATIONAL THERAPY INITIAL EVALUATION ADULT - GENERAL OBSERVATIONS, REHAB EVAL
Pt was seen for initial OT consult, encountered in bed on cardiac monitoring; right knee dressing is clean dry and intact; pt was AA&Ox4, cooperative & followed commands. Pt c/o right knee pain due to s/p TKR; this limits pt's activity tolerance ,balance, ROM , muscle strength ADL management and functional mobility

## 2019-06-20 NOTE — PHYSICAL THERAPY INITIAL EVALUATION ADULT - ADDITIONAL COMMENTS
(Today confirmed below pre-op information obtained by MARY ALICE Hoff on 06/14/19):   There is 3 steps w/o rail at the front entry and one step, c door frame to hold at the back entry. There is 11 steps c L rail up to negotiate at home. Pt may stay on the main floor if needed.   Pt has a tub/shower combo and a walk-in shower c comfort toilet seat in . Pt is R handed and drives. Pt has a straight cane and rolling walker and they are in good condition. Pt wears R knee brace all the time for support. Wife will be available to assist pt in post -op care upon discharge home.    Today 6/20, reports rolling walker is kept at attic and not sure if it's in good condition. Cane reported to be bent and heavy. Patient was bariatric when he used cane and rolling walker, now has lost significant weight. Patient requests for new cane. Advised rolling walker will be requested from .

## 2019-06-20 NOTE — OCCUPATIONAL THERAPY INITIAL EVALUATION ADULT - PLANNED THERAPY INTERVENTIONS, OT EVAL
energy conservation techniques/ADL retraining/balance training/fine motor coordination training/parent/caregiver training.../neuromuscular re-education/bed mobility training/strengthening/ROM/transfer training

## 2019-06-20 NOTE — PHYSICAL THERAPY INITIAL EVALUATION ADULT - CRITERIA FOR SKILLED THERAPEUTIC INTERVENTIONS
impairments found/functional limitations in following categories/anticipated equipment needs at discharge/risk reduction/prevention/anticipated discharge recommendation/rehab potential

## 2019-06-20 NOTE — OCCUPATIONAL THERAPY INITIAL EVALUATION ADULT - RANGE OF MOTION EXAMINATION, UPPER EXTREMITY
Right UE Active ROM was WFL (within functional limits)/except in shoulder,; except in right pinky finger due to fracture and s/p ORIF left shoulder ROM is 0-9o degrees,/Left UE Passive ROM was WFL  (within functional limits)/Left UE Active ROM was WFL (within functional limits)/Right UE Passive ROM was WFL  (within functional limits)

## 2019-06-20 NOTE — PHYSICAL THERAPY INITIAL EVALUATION ADULT - RANGE OF MOTION, PT EVAL
GOAL: Patient will demonstrate 90 degrees or more of active ROM to both knees for ease c mobility and transfers.

## 2019-06-20 NOTE — OCCUPATIONAL THERAPY INITIAL EVALUATION ADULT - SOCIAL CONCERNS
Pt voiced concerns about his recovery at home. Pt has  his spouse to assist him after discharge home post-operatively./Complex psychosocial needs/coping issues

## 2019-06-20 NOTE — PHYSICAL THERAPY INITIAL EVALUATION ADULT - GAIT TRAINING, PT EVAL
GOAL: In 4 weeks, patient will demonstrate safe gait in and outdoors c use of appropriate mobility device x 1000 feet or more.

## 2019-06-20 NOTE — OCCUPATIONAL THERAPY INITIAL EVALUATION ADULT - RANGE OF MOTION, PT EVAL
Pt will increase ROM in right knee by 30 degrees to safely and independently perform lower body self care tasks within 30 days .

## 2019-06-20 NOTE — OCCUPATIONAL THERAPY INITIAL EVALUATION ADULT - OCCUPATION
Pt formerly worked at a , but has been on disability due to s/p back injury after a machine fell on him at his job.

## 2019-06-20 NOTE — PHYSICAL THERAPY INITIAL EVALUATION ADULT - STRENGTHENING, PT EVAL
GOAL: To demonstrate gross muscle strength of 4/5 or greater to both lower limbs, to enable transfers and gait across all surfaces, elevated and flat, without undue fatigue or falls risk but with good lower limb control and balance.

## 2019-06-20 NOTE — OCCUPATIONAL THERAPY INITIAL EVALUATION ADULT - RANGE OF MOTION EXAMINATION, LOWER EXTREMITY
GI bleed
Left LE Passive ROM was WFL (w/i functional limits)/Left LE Active Assistive ROM was WFL (within functional limits)/right knee ROM is 0-45 degrees with pain

## 2019-06-20 NOTE — PHYSICAL THERAPY INITIAL EVALUATION ADULT - PLANNED THERAPY INTERVENTIONS, PT EVAL
gait training/balance training/strengthening/stretching/transfer training/manual therapy techniques/ROM balance training/manual therapy techniques/transfer training/ROM/strengthening/gait training

## 2019-06-20 NOTE — PROGRESS NOTE ADULT - SUBJECTIVE AND OBJECTIVE BOX
Patient is seen and examined at bedside. Denies CP/SOB/Dizziness/N/V/D/HA. Pain is controlled.      Vital Signs Last 24 Hrs  T(C): 36.9 (20 Jun 2019 05:30), Max: 36.9 (20 Jun 2019 05:30)  T(F): 98.4 (20 Jun 2019 05:30), Max: 98.4 (20 Jun 2019 05:30)  HR: 66 (20 Jun 2019 05:30) (52 - 71)  BP: 151/88 (20 Jun 2019 05:30) (120/72 - 151/88)  BP(mean): --  RR: 16 (20 Jun 2019 05:30) (14 - 18)  SpO2: 98% (20 Jun 2019 05:30) (94% - 100%)      PHYSICAL EXAM:  General: NAD, WDWN.   Neuro:  Alert & responsive  HEENT: NCAT, EOMI, conjunctiva clear  abd: soft, NT/ND  Right knee: Prineo Dressing C/D/I, Right LE: Motor intact + EHL/FHL/TA/GS.  Sensation is grossly intact.  Extremity warm, compartments soft, compressible. No calf tenderness. DP 2+   Left LE: Motor intact +EHL/FHL/TA/GS. Sensation is grossly intact. Extremity warm, compartments soft, compressible. No calf tenderness. DP2+    Labs:                          14.7   13.40 )-----------( 243      ( 20 Jun 2019 07:25 )             46.7       06-20    139  |  104  |  21  ----------------------------<  120<H>  4.7   |  27  |  1.08    Ca    8.5      20 Jun 2019 07:25        A/P: Patient is a 53y y/o Male s/p right total knee arthroplasty, POD # 1  -wound care, knee extension/leg elevation, cryocuff, isometric exercises, new medications reviewed with pt  -Pain control/analgesia  -Inc spirometry reviewed with pt, demonstrated competence  -DVT prophylaxis with Venodynes/Aspirin  -PT/OT/WBAT  -medical consult Dr Strong, allan  -Pt seen in am with Dr Chappell  -BIJAL planning: home today

## 2019-06-20 NOTE — DISCHARGE NOTE NURSING/CASE MANAGEMENT/SOCIAL WORK - NSDCDPATPORTLINK_GEN_ALL_CORE
You can access the [facility] Portal, offered by [facility], by registering with the following website: [email or web address]

## 2019-06-20 NOTE — PHYSICAL THERAPY INITIAL EVALUATION ADULT - GENERAL OBSERVATIONS, REHAB EVAL
Patient sat up on recliner chair. + cardiac monitor, right knee prineo dermabond dressing, wife by bedside.

## 2019-06-20 NOTE — OCCUPATIONAL THERAPY INITIAL EVALUATION ADULT - LIVES WITH, PROFILE
alone/spouse/children/in a private house with  entry step children/in a private house with 3 entry steps without hand rails  and 1 steps from the back without hand rail. Once inside, pt has to negotiate a flight of stairs to access the master bedroom and bathroom. The bathroom has a tub/shower combination, stall shower and comfort height toilet.  Pt can set  up at the main level where there is another bed room / bathroom  The bathroom has a tub/shower combination and comfort toilet./alone/spouse children/alone/in a private house with 3 entry steps without hand rails and 1 steps from the back without hand rail. Once inside, pt has to negotiate a flight of stairs to access the master bedroom and bathroom. The bathroom has a tub/shower combination, stall shower and comfort height toilet. Pt can set up at the main level where there is another bed room/ bathroom. This bathroom has a tub/shower combination and comfort toilet./spouse

## 2019-06-20 NOTE — DISCHARGE NOTE NURSING/CASE MANAGEMENT/SOCIAL WORK - NURSING SECTION COMPLETE
Maddie is a 5 month old girl presenting with her parents for a well-child exam.  No acute illness or concerns including hearing and vision.  Developmental screen without delays.  Maddie is thriving on a cow's milk formula with iron.  No constipation.  83 %ile (Z= 0.95) based on WHO (Girls, 0-2 years) weight-for-age data using vitals from 2017., >99 %ile (Z > 2.33) based on WHO (Girls, 0-2 years) length-for-age data using vitals from 2017., 92 %ile (Z= 1.42) based on WHO (Girls, 0-2 years) head circumference-for-age data using vitals from 2017..  The family has fluoridated water supply.    Patient Active Problem List   Diagnosis   • Single liveborn, born in hospital, delivered by vaginal delivery     ALLERGIES:  No Known Allergies.    No current outpatient prescriptions on file.     No current facility-administered medications for this visit.        Immunizations are up-to-date for 2 months of age.    Objective:  Alert and well-appearing.  Vitals:    09/07/17 1024   Pulse: 136   Temp: 97.8 °F (36.6 °C)   TempSrc: Temporal Artery   Weight: 7.881 kg   Height: 27.5\" (69.9 cm)   HC: 43.5 cm (17.13\")     HEENT:  Symmetric head shape.  Anterior fontanelle soft and flat.  Tympanic membranes clear bilaterally.  Pupils equally round and reactive to light, extraocular movements intact.  Symmetric red and corneal reflexes.  Oral mucous membranes moist, no lesions.    NECK:  Supple, full range of motion, lymph nodes not enlarged.  LUNGS:  Clear to auscultation.  CARDIOVASCULAR:  Heart with regular rate and rhythm, no murmur.  Femoral pulses strong bilaterally.  ABDOMEN:  Nondistended, normoactive bowel sounds.  No hepatosplenomegaly or mass.  Soft, nontender to palpation.  GENITOURINARY:  Fred stage 1 female, normal vaginal opening.  MUSCULOSKELETAL:  Symmetric leg length, normal foot anatomy.  Straight spine.  Upper and lower extremity joints, including bilateral hip joints, full range of motion.  SKIN:   Well-perfused, normal turgor, no rash.  NEUROLOGICAL:  No facial asymmetry.  Symmetric tone and movements in upper and lower extremities.  Maddie is pushing up on extended arms, holding head erect when placed prone, and is bearing weight on legs when held standing.    ASSESSMENT:  Healthy 5 month old girl.    PLAN:  Reviewed recommended nutrition, dental hygiene, injury prevention, skin care and protection.  The parent was counseled on the vaccines and components, including risks and benefits of the immunizations, as well as risks of not receiving the immunizations. Related handouts were provided.  Patient received Pediarix, Prevnar 13, Hib and Rotateq vaccines.  Recommended influenza vaccine this fall and well child exam in 1 month.   Patient/Caregiver provided printed discharge information.

## 2019-06-20 NOTE — PHYSICAL THERAPY INITIAL EVALUATION ADULT - IMPAIRED TRANSFERS: SIT/STAND, REHAB EVAL
impaired balance decreased sensation/impaired balance/narrow base of support/pain/decreased strength

## 2019-06-20 NOTE — OCCUPATIONAL THERAPY INITIAL EVALUATION ADULT - ADDITIONAL COMMENTS
Prior to admission, pt was functioning in his roles, self sufficient & ambulating independently with a single axis cane. Presently pt needs assistance with lower body self care tasks due to pain, weakness, stiffness and decreased ROM from light TKR. Pt is right hand dominant and wears glasses for reading. ROM is limited in left shoulder and right pinky finger due to traumatic and weakness.,

## 2019-06-20 NOTE — PHYSICAL THERAPY INITIAL EVALUATION ADULT - BALANCE TRAINING, PT EVAL
GOAL: In 4 weeks, patient will demonstrate improved balance in static and dynamic standing to improve falls risk during transfers and gait.

## 2019-06-24 LAB — SURGICAL PATHOLOGY STUDY: SIGNIFICANT CHANGE UP

## 2019-07-02 DIAGNOSIS — E66.9 OBESITY, UNSPECIFIED: ICD-10-CM

## 2019-07-02 DIAGNOSIS — M17.11 UNILATERAL PRIMARY OSTEOARTHRITIS, RIGHT KNEE: ICD-10-CM

## 2019-07-02 DIAGNOSIS — N18.3 CHRONIC KIDNEY DISEASE, STAGE 3 (MODERATE): ICD-10-CM

## 2019-07-02 DIAGNOSIS — E78.5 HYPERLIPIDEMIA, UNSPECIFIED: ICD-10-CM

## 2019-07-02 DIAGNOSIS — I12.9 HYPERTENSIVE CHRONIC KIDNEY DISEASE WITH STAGE 1 THROUGH STAGE 4 CHRONIC KIDNEY DISEASE, OR UNSPECIFIED CHRONIC KIDNEY DISEASE: ICD-10-CM

## 2019-07-02 DIAGNOSIS — Z96.652 PRESENCE OF LEFT ARTIFICIAL KNEE JOINT: ICD-10-CM

## 2019-07-02 DIAGNOSIS — M54.9 DORSALGIA, UNSPECIFIED: ICD-10-CM

## 2019-08-15 PROBLEM — M54.9 DORSALGIA, UNSPECIFIED: Chronic | Status: ACTIVE | Noted: 2019-06-14

## 2019-08-17 ENCOUNTER — APPOINTMENT (OUTPATIENT)
Dept: DERMATOLOGY | Facility: CLINIC | Age: 54
End: 2019-08-17
Payer: COMMERCIAL

## 2019-08-17 PROCEDURE — 99203 OFFICE O/P NEW LOW 30 MIN: CPT | Mod: 25

## 2019-08-17 PROCEDURE — 11900 INJECT SKIN LESIONS </W 7: CPT | Mod: 59

## 2019-08-17 PROCEDURE — 17000 DESTRUCT PREMALG LESION: CPT

## 2019-10-22 ENCOUNTER — APPOINTMENT (OUTPATIENT)
Dept: DERMATOLOGY | Facility: CLINIC | Age: 54
End: 2019-10-22
Payer: COMMERCIAL

## 2019-10-22 PROCEDURE — 17000 DESTRUCT PREMALG LESION: CPT

## 2019-10-22 PROCEDURE — 99213 OFFICE O/P EST LOW 20 MIN: CPT | Mod: 25

## 2020-06-18 ENCOUNTER — APPOINTMENT (OUTPATIENT)
Dept: DERMATOLOGY | Facility: CLINIC | Age: 55
End: 2020-06-18

## 2020-06-23 ENCOUNTER — APPOINTMENT (OUTPATIENT)
Dept: DERMATOLOGY | Facility: CLINIC | Age: 55
End: 2020-06-23

## 2021-08-02 NOTE — PHYSICAL THERAPY INITIAL EVALUATION ADULT - DISCHARGE DISPOSITION, PT EVAL
graduate school home w/ home PT home w/ home PT/To further improve mobility, AROM, safety with gait and improve endurance in home setting.

## 2021-08-05 ENCOUNTER — RESULT REVIEW (OUTPATIENT)
Age: 56
End: 2021-08-05

## 2021-08-06 NOTE — OCCUPATIONAL THERAPY INITIAL EVALUATION ADULT - VISUAL ASSESSMENT: SCANNING
[Arthralgias] : arthralgias [Joint Pain] : joint pain [As Noted in HPI] : as noted in HPI [Negative] : Heme/Lymph [de-identified] : chronic peripheral neuropathy  WFL

## 2021-08-24 NOTE — PHYSICAL THERAPY INITIAL EVALUATION ADULT - PATIENT PROFILE REVIEW, REHAB EVAL
yes Fully vaccinated 23M p/w SOB, nausea, nasal congestion for the past few days. Denies exertional CP, n/v/d, fever. On arrival vs wnl.     Gen - NAD, Head - NCAT, Pharynx - clear, MMM, Heart - RRR, no m/g/r, Lungs - CTAB, no w/c/r, Abdomen - soft, NT, ND, Skin - No rash, Extremities - FROM, no edema, erythema, ecchymosis, brisk cap refill, Neuro - A&O x3, equal strength and sensation, non-focal exam    a/p: will sent covid test and give f/u, return precautions

## 2021-09-01 NOTE — PATIENT PROFILE ADULT - NSPROEDAREADYLEARN_GEN_A_NUR
Skin Substitute Text: Given the location of the defect, shape of the defect and the proximity to free margins a skin substitute graft was deemed most appropriate.  The graft material was still in place from previous application. The graft was rehydrated with normal saline and dressing applied as noted above. none

## 2021-12-04 ENCOUNTER — EMERGENCY (EMERGENCY)
Facility: HOSPITAL | Age: 56
LOS: 1 days | Discharge: OTHER INSTITUTION | End: 2021-12-04
Attending: EMERGENCY MEDICINE
Payer: COMMERCIAL

## 2021-12-04 VITALS
WEIGHT: 175.05 LBS | SYSTOLIC BLOOD PRESSURE: 155 MMHG | HEIGHT: 70 IN | DIASTOLIC BLOOD PRESSURE: 92 MMHG | HEART RATE: 80 BPM | RESPIRATION RATE: 16 BRPM | TEMPERATURE: 98 F | OXYGEN SATURATION: 98 %

## 2021-12-04 DIAGNOSIS — Z98.89 OTHER SPECIFIED POSTPROCEDURAL STATES: Chronic | ICD-10-CM

## 2021-12-04 DIAGNOSIS — Z96.659 PRESENCE OF UNSPECIFIED ARTIFICIAL KNEE JOINT: Chronic | ICD-10-CM

## 2021-12-04 DIAGNOSIS — F33.3 MAJOR DEPRESSIVE DISORDER, RECURRENT, SEVERE WITH PSYCHOTIC SYMPTOMS: ICD-10-CM

## 2021-12-04 DIAGNOSIS — Z98.890 OTHER SPECIFIED POSTPROCEDURAL STATES: Chronic | ICD-10-CM

## 2021-12-04 LAB
ANION GAP SERPL CALC-SCNC: 16 MMOL/L — SIGNIFICANT CHANGE UP (ref 5–17)
APAP SERPL-MCNC: <3 UG/ML — LOW (ref 10–26)
BASOPHILS # BLD AUTO: 0.04 K/UL — SIGNIFICANT CHANGE UP (ref 0–0.2)
BASOPHILS NFR BLD AUTO: 0.5 % — SIGNIFICANT CHANGE UP (ref 0–2)
BUN SERPL-MCNC: 15.6 MG/DL — SIGNIFICANT CHANGE UP (ref 8–20)
CALCIUM SERPL-MCNC: 8.8 MG/DL — SIGNIFICANT CHANGE UP (ref 8.6–10.2)
CHLORIDE SERPL-SCNC: 98 MMOL/L — SIGNIFICANT CHANGE UP (ref 98–107)
CO2 SERPL-SCNC: 20 MMOL/L — LOW (ref 22–29)
CREAT SERPL-MCNC: 1.33 MG/DL — HIGH (ref 0.5–1.3)
EOSINOPHIL # BLD AUTO: 0.15 K/UL — SIGNIFICANT CHANGE UP (ref 0–0.5)
EOSINOPHIL NFR BLD AUTO: 1.9 % — SIGNIFICANT CHANGE UP (ref 0–6)
ETHANOL SERPL-MCNC: <10 MG/DL — SIGNIFICANT CHANGE UP (ref 0–9)
GLUCOSE SERPL-MCNC: 113 MG/DL — HIGH (ref 70–99)
HCT VFR BLD CALC: 49.1 % — SIGNIFICANT CHANGE UP (ref 39–50)
HGB BLD-MCNC: 15.2 G/DL — SIGNIFICANT CHANGE UP (ref 13–17)
IMM GRANULOCYTES NFR BLD AUTO: 0.4 % — SIGNIFICANT CHANGE UP (ref 0–1.5)
LYMPHOCYTES # BLD AUTO: 1.34 K/UL — SIGNIFICANT CHANGE UP (ref 1–3.3)
LYMPHOCYTES # BLD AUTO: 17.1 % — SIGNIFICANT CHANGE UP (ref 13–44)
MCHC RBC-ENTMCNC: 26.9 PG — LOW (ref 27–34)
MCHC RBC-ENTMCNC: 31 GM/DL — LOW (ref 32–36)
MCV RBC AUTO: 86.7 FL — SIGNIFICANT CHANGE UP (ref 80–100)
MONOCYTES # BLD AUTO: 0.93 K/UL — HIGH (ref 0–0.9)
MONOCYTES NFR BLD AUTO: 11.8 % — SIGNIFICANT CHANGE UP (ref 2–14)
NEUTROPHILS # BLD AUTO: 5.36 K/UL — SIGNIFICANT CHANGE UP (ref 1.8–7.4)
NEUTROPHILS NFR BLD AUTO: 68.3 % — SIGNIFICANT CHANGE UP (ref 43–77)
PLATELET # BLD AUTO: 388 K/UL — SIGNIFICANT CHANGE UP (ref 150–400)
POTASSIUM SERPL-MCNC: 4.4 MMOL/L — SIGNIFICANT CHANGE UP (ref 3.5–5.3)
POTASSIUM SERPL-SCNC: 4.4 MMOL/L — SIGNIFICANT CHANGE UP (ref 3.5–5.3)
RBC # BLD: 5.66 M/UL — SIGNIFICANT CHANGE UP (ref 4.2–5.8)
RBC # FLD: 13.2 % — SIGNIFICANT CHANGE UP (ref 10.3–14.5)
SALICYLATES SERPL-MCNC: <0.6 MG/DL — LOW (ref 10–20)
SARS-COV-2 RNA SPEC QL NAA+PROBE: SIGNIFICANT CHANGE UP
SODIUM SERPL-SCNC: 134 MMOL/L — LOW (ref 135–145)
WBC # BLD: 7.85 K/UL — SIGNIFICANT CHANGE UP (ref 3.8–10.5)
WBC # FLD AUTO: 7.85 K/UL — SIGNIFICANT CHANGE UP (ref 3.8–10.5)

## 2021-12-04 PROCEDURE — 99220: CPT

## 2021-12-04 PROCEDURE — 90792 PSYCH DIAG EVAL W/MED SRVCS: CPT

## 2021-12-04 PROCEDURE — 93010 ELECTROCARDIOGRAM REPORT: CPT

## 2021-12-04 RX ORDER — TRAZODONE HCL 50 MG
50 TABLET ORAL ONCE
Refills: 0 | Status: COMPLETED | OUTPATIENT
Start: 2021-12-04 | End: 2021-12-04

## 2021-12-04 RX ORDER — OXYCODONE HYDROCHLORIDE 5 MG/1
15 TABLET ORAL EVERY 6 HOURS
Refills: 0 | Status: DISCONTINUED | OUTPATIENT
Start: 2021-12-04 | End: 2021-12-04

## 2021-12-04 RX ORDER — OXYCODONE HYDROCHLORIDE 5 MG/1
15 TABLET ORAL
Refills: 0 | Status: COMPLETED | OUTPATIENT
Start: 2021-12-04 | End: 2021-12-11

## 2021-12-04 RX ORDER — TRAZODONE HCL 50 MG
50 TABLET ORAL AT BEDTIME
Refills: 0 | Status: DISCONTINUED | OUTPATIENT
Start: 2021-12-04 | End: 2021-12-08

## 2021-12-04 RX ORDER — OXYCODONE HYDROCHLORIDE 5 MG/1
5 TABLET ORAL EVERY 6 HOURS
Refills: 0 | Status: DISCONTINUED | OUTPATIENT
Start: 2021-12-04 | End: 2021-12-04

## 2021-12-04 RX ORDER — LISINOPRIL 2.5 MG/1
20 TABLET ORAL DAILY
Refills: 0 | Status: DISCONTINUED | OUTPATIENT
Start: 2021-12-04 | End: 2021-12-08

## 2021-12-04 RX ORDER — OXYCODONE HYDROCHLORIDE 5 MG/1
15 TABLET ORAL
Refills: 0 | Status: DISCONTINUED | OUTPATIENT
Start: 2021-12-04 | End: 2021-12-04

## 2021-12-04 RX ORDER — ALPRAZOLAM 0.25 MG
1 TABLET ORAL EVERY 12 HOURS
Refills: 0 | Status: COMPLETED | OUTPATIENT
Start: 2021-12-04 | End: 2021-12-11

## 2021-12-04 RX ORDER — PANTOPRAZOLE SODIUM 20 MG/1
40 TABLET, DELAYED RELEASE ORAL EVERY 12 HOURS
Refills: 0 | Status: DISCONTINUED | OUTPATIENT
Start: 2021-12-04 | End: 2021-12-08

## 2021-12-04 RX ORDER — BACLOFEN 100 %
20 POWDER (GRAM) MISCELLANEOUS
Refills: 0 | Status: DISCONTINUED | OUTPATIENT
Start: 2021-12-04 | End: 2021-12-08

## 2021-12-04 RX ORDER — ALPRAZOLAM 0.25 MG
1 TABLET ORAL ONCE
Refills: 0 | Status: DISCONTINUED | OUTPATIENT
Start: 2021-12-04 | End: 2021-12-04

## 2021-12-04 RX ORDER — DIPHENHYDRAMINE HCL 50 MG
50 CAPSULE ORAL EVERY 6 HOURS
Refills: 0 | Status: DISCONTINUED | OUTPATIENT
Start: 2021-12-04 | End: 2021-12-08

## 2021-12-04 RX ORDER — KETAMINE HYDROCHLORIDE 100 MG/ML
500 INJECTION INTRAMUSCULAR; INTRAVENOUS ONCE
Refills: 0 | Status: DISCONTINUED | OUTPATIENT
Start: 2021-12-04 | End: 2021-12-04

## 2021-12-04 RX ORDER — OXYCODONE HYDROCHLORIDE 5 MG/1
15 TABLET ORAL EVERY 6 HOURS
Refills: 0 | Status: DISCONTINUED | OUTPATIENT
Start: 2021-12-04 | End: 2021-12-06

## 2021-12-04 RX ORDER — HALOPERIDOL DECANOATE 100 MG/ML
5 INJECTION INTRAMUSCULAR EVERY 6 HOURS
Refills: 0 | Status: DISCONTINUED | OUTPATIENT
Start: 2021-12-04 | End: 2021-12-08

## 2021-12-04 RX ORDER — BACLOFEN 100 %
30 POWDER (GRAM) MISCELLANEOUS
Refills: 0 | Status: DISCONTINUED | OUTPATIENT
Start: 2021-12-04 | End: 2021-12-08

## 2021-12-04 RX ORDER — RISPERIDONE 4 MG/1
0.5 TABLET ORAL EVERY 12 HOURS
Refills: 0 | Status: DISCONTINUED | OUTPATIENT
Start: 2021-12-04 | End: 2021-12-08

## 2021-12-04 RX ADMIN — Medication 2 MILLIGRAM(S): at 02:50

## 2021-12-04 RX ADMIN — Medication 1 MILLIGRAM(S): at 08:23

## 2021-12-04 RX ADMIN — Medication 50 MILLIGRAM(S): at 23:28

## 2021-12-04 RX ADMIN — RISPERIDONE 0.5 MILLIGRAM(S): 4 TABLET ORAL at 17:18

## 2021-12-04 RX ADMIN — Medication 30 MILLIGRAM(S): at 09:51

## 2021-12-04 RX ADMIN — RISPERIDONE 0.5 MILLIGRAM(S): 4 TABLET ORAL at 09:54

## 2021-12-04 RX ADMIN — OXYCODONE HYDROCHLORIDE 15 MILLIGRAM(S): 5 TABLET ORAL at 23:17

## 2021-12-04 RX ADMIN — PANTOPRAZOLE SODIUM 40 MILLIGRAM(S): 20 TABLET, DELAYED RELEASE ORAL at 17:19

## 2021-12-04 RX ADMIN — Medication 20 MILLIGRAM(S): at 13:52

## 2021-12-04 RX ADMIN — LISINOPRIL 20 MILLIGRAM(S): 2.5 TABLET ORAL at 06:45

## 2021-12-04 RX ADMIN — Medication 4 MILLIGRAM(S): at 03:08

## 2021-12-04 RX ADMIN — Medication 30 MILLIGRAM(S): at 22:21

## 2021-12-04 RX ADMIN — KETAMINE HYDROCHLORIDE 500 MILLIGRAM(S): 100 INJECTION INTRAMUSCULAR; INTRAVENOUS at 02:40

## 2021-12-04 RX ADMIN — OXYCODONE HYDROCHLORIDE 15 MILLIGRAM(S): 5 TABLET ORAL at 05:44

## 2021-12-04 RX ADMIN — OXYCODONE HYDROCHLORIDE 15 MILLIGRAM(S): 5 TABLET ORAL at 15:25

## 2021-12-04 RX ADMIN — OXYCODONE HYDROCHLORIDE 15 MILLIGRAM(S): 5 TABLET ORAL at 09:36

## 2021-12-04 RX ADMIN — OXYCODONE HYDROCHLORIDE 15 MILLIGRAM(S): 5 TABLET ORAL at 22:22

## 2021-12-04 RX ADMIN — OXYCODONE HYDROCHLORIDE 5 MILLIGRAM(S): 5 TABLET ORAL at 10:28

## 2021-12-04 RX ADMIN — OXYCODONE HYDROCHLORIDE 15 MILLIGRAM(S): 5 TABLET ORAL at 13:45

## 2021-12-04 RX ADMIN — Medication 1 MILLIGRAM(S): at 17:18

## 2021-12-04 NOTE — ED CDU PROVIDER INITIAL DAY NOTE - OBJECTIVE STATEMENT
55yo male with PMH chronic back pain, HTN, presenting with aggressive behavior. Patient's son stated that patient was possibly suicidal because he is injured and cannot work. Per family- patient becoming more agitated and paranoid over last few years. Patient evaluated by psychiatry, plan for acute inpatient psychiatric admission for stabilization.

## 2021-12-04 NOTE — ED ADULT TRIAGE NOTE - CHIEF COMPLAINT QUOTE
Patient BIBEMS escorted by police for being accused of aggressive behavior at home. Patient states he does not have a good relationship with his son at home and that his son called the  for that reason. Patient offers no complaints at this time.

## 2021-12-04 NOTE — ED BEHAVIORAL HEALTH ASSESSMENT NOTE - HPI (INCLUDE ILLNESS QUALITY, SEVERITY, DURATION, TIMING, CONTEXT, MODIFYING FACTORS, ASSOCIATED SIGNS AND SYMPTOMS)
57y/o M PMhx Depression, HTN, Chronic back pain secondary to trauma, BIB police after Pt's son called the police concerned that the patient was going to hurt somebody or himself. Pt uncooperative, severely agitated, recieved ketamine and ativan in the ED denies any suicidal ideation, threats, aggressive behavior, hallucinations, or history of violence, and is convinced that his son "is trying to lock me away." Pt reports seeing his psychiatrist every 10 days for his depression and is doing fine on his medication. Collateral obtained from both pt. wife Micheline and pt. Son report that he is has been getting progressively more paranoid and has been making suicidal comments and threats to hurt others that he believes has been involved with his wife years ago and despite seeing his Psychiatrist he has been worsening for the past 3 weeks. 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 Collateral obtained from Dr. Mueller reports that "he is beyond what I can offer on the outpatient side" and medication management has been unsuccessful at treating his paranoia to this point and recommended inpatient psychiatric treatment. 55y/o M PMhx Depression, HTN, Chronic back pain secondary to trauma, BIB police after Pt's son called the police concerned that the patient was going to hurt somebody or himself. Pt uncooperative, severely agitated, received ketamine and ativan in the ED denies any suicidal ideation, threats, aggressive behavior, hallucinations, or history of violence, and is convinced that his son "is trying to lock me away." Pt reports seeing his psychiatrist every 10 days for his depression and is doing fine on his medication. Collateral obtained from both pt. wife Micheline and pt. Son report that he is has been getting progressively more paranoid and has been making suicidal comments and threats to hurt others that he believes has been involved with his wife years ago and despite seeing his Psychiatrist he has been worsening for the past 3 weeks. 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 Collateral obtained from Dr. Mueller reports that "he is beyond what I can offer on the outpatient side" and medication management has been unsuccessful at treating his paranoia to this point and recommended inpatient psychiatric treatment.

## 2021-12-04 NOTE — ED BEHAVIORAL HEALTH ASSESSMENT NOTE - LEGAL HISTORY
Involved in a 5 million dollar legal case after a traumatic accident at his work where he was run over by a bulldozer

## 2021-12-04 NOTE — ED ADULT NURSE REASSESSMENT NOTE - NS ED NURSE REASSESS COMMENT FT1
Assumed care of patient at 1930.  Pt alert and cooperative,  lying in darken room v.s taken and chart.  Pt   offering no complaints  will monitor and chart chnages and maintain safe environemnt

## 2021-12-04 NOTE — ED BEHAVIORAL HEALTH ASSESSMENT NOTE - CASE SUMMARY
h/o mood disorder paranoia chronic pain with high doses of narcotics patient w volatile affect and suspiciousness Psychiatrist recommended inpatient admission

## 2021-12-04 NOTE — ED BEHAVIORAL HEALTH ASSESSMENT NOTE - OTHER
Marital troubles, chronic pain Pt's son Dr. Mueller outpatient psychiatry Paranoid, believes his family wants to lock him up, doesn't believe Dr. Mueller's recommendation Appears uncomfortable, repositions himself frequently

## 2021-12-04 NOTE — ED CDU PROVIDER INITIAL DAY NOTE - PHYSICAL EXAMINATION
Gen: agitated. aox3  Head: NCAT  HEENT: oral mucosa moist, normal conjunctiva, oropharynx clear without exudate or erythema  Lung: CTAB, no respiratory distress, no wheezing, rales, rhonchi  CV: normal s1/s2, rrr, no murmurs, Normal perfusion, pulses 2+ throughout  Abd: soft, NTND  MSK: No edema, no visible deformities, full range of motion in all 4 extremities  Neuro: No focal neurologic deficits  Skin: No rash   Psych: elevated speech. labile affect

## 2021-12-04 NOTE — ED CDU PROVIDER INITIAL DAY NOTE - NS_EDPROVIDERDISPOUSERTYPE_ED_A_ED
denies pmhx. Per dad 7AM slipped in house hitting back of head on tile, was fine, running around then again at 8AM fell forward hitting head on wood floor. Denies LOC for both incidents. Then went to park about 530PM and started vomiting on/off since. Pt. also has developed fever - mom tried to give Tylenol at about 9PM but vomited also then. Pt. alert/appropriate, lungs clear, abd soft, no bruising noted at this time, PERRLA, no distress
Attending Attestation (For Attendings USE Only)...

## 2021-12-04 NOTE — ED ADULT NURSE REASSESSMENT NOTE - NS ED NURSE REASSESS COMMENT FT1
received pt from night shift, 1:1 in progress pt agitated.  Demanding his belongings cane and cell phone, pt allowed to make one phone call on house phone. security called at bedside to review with pt regulations in [place].

## 2021-12-04 NOTE — ED BEHAVIORAL HEALTH ASSESSMENT NOTE - DETAILS
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 reports no suicidal ideations devan Mahoney

## 2021-12-04 NOTE — ED ADULT NURSE REASSESSMENT NOTE - NS ED NURSE REASSESS COMMENT FT1
anna spoke with pt Dr Smith notified of pts behavior, xanax given as ordered. pt resting with wife at bedside. diet called for.  milagro musa

## 2021-12-04 NOTE — ED PROVIDER NOTE - CLINICAL SUMMARY MEDICAL DECISION MAKING FREE TEXT BOX
Pt denies any suicidal or homicidal thoughts but per collateral from family they are concerned about potential for violence against himself or otheres, believe he may be psychotic, and feel that he is not safe to be home. Became agitated and was refusing to participate in any evaluation after being told he would need psychiatric evaluation and was medicated with ketamine and lorazepam to protect safety and facilitate care. Plan for psychiatric evaluation to determine need for admission.

## 2021-12-04 NOTE — ED BEHAVIORAL HEALTH ASSESSMENT NOTE - DESCRIPTION
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 Pt received 500mg IM ketamine and 2mg IM ativan, was still agitated was offered and then accepted PO xanax. Patient interviewed in  interview room

## 2021-12-04 NOTE — ED BEHAVIORAL HEALTH ASSESSMENT NOTE - SUMMARY
57 y/o M BIB police after sending texts to son that was interpreted as suicidal, family concerns of delusions regarding an alleged affair with the wife, believes everyone wants to take away his rights, collateral obtained from Dr. Mueller confirms need for inpatient psychiatric treatment and medication adjustment. Of note patient is prescribed large quanties of oxycodone which is concerning as may be contributory to his condition 57 y/o M BIB police after sending texts to son that was interpreted as suicidal, family concerns of delusions regarding an alleged affair with the wife, believes everyone wants to take away his rights, collateral obtained from Dr. Mueller confirms need for inpatient psychiatric treatment and medication adjustment. Of note patient is prescribed large quanties of oxycodone which is concerning as may be contributory to his condition I-STOP query done

## 2021-12-04 NOTE — ED ADULT NURSE REASSESSMENT NOTE - NS ED NURSE REASSESS COMMENT FT1
Pt. increasingly agitated, refusing to undress, or get Blood taken.  MD Nur and security at bedside.

## 2021-12-04 NOTE — ED BEHAVIORAL HEALTH ASSESSMENT NOTE - PRIVATE RESIDENCE
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

## 2021-12-04 NOTE — ED PROVIDER NOTE - OBJECTIVE STATEMENT
56yom w/ depression brought in by police for psychiatric evaluation. Police called by pt's son stating he may be suicidal. Pt states he is depressed because he is injured and can no longer work. Tonight he was at one of his other buildings because he was moving his things, planning on leaving his house and he texted his son "siva bye" but denies being suicidal.     Per son Denzel and wife Micheline (numbers in chart), he has been increasingly paranoid and agitated, going on for years but worse recently, fixated on a thought that his wife cheated on him. Micheline states he has threatened violence on others whom he believes had an affair with his wife, and has severe anger and impulse control issues to the point where she is concerned for her safety. He does have access to firearms. Denies any substance abuse. Tonight he was making statements to them alluding to possible self harm such as "tomorrow I wont be here" etc.

## 2021-12-04 NOTE — ED PROVIDER NOTE - CARE PLAN
Principal Discharge DX:	Depression, major, recurrent, severe with psychosis  Secondary Diagnosis:	Depression, major, recurrent, severe with psychosis   1

## 2021-12-04 NOTE — ED BEHAVIORAL HEALTH ASSESSMENT NOTE - CURRENT MEDICATION
Home Medications:  Alprazolam 60mg 1mg BID PRN   acetaminophen 325 mg oral tablet: 3 tab(s) orally every 8 hours (20 Jun 2019 11:50)  ascorbic acid 500 mg oral tablet: 1 tab(s) orally 2 times a day (20 Jun 2019 11:50)  docusate sodium 100 mg oral capsule: 1 cap(s) orally 3 times a day (20 Jun 2019 11:50)  Dextroamphetamine (Adderall) 30mg BID  magnesium hydroxide 8% oral suspension: 30 milliliter(s) orally once a day, As needed, Constipation (20 Jun 2019 11:50)  Multiple Vitamins oral tablet: 1 tab(s) orally once a day (20 Jun 2019 11:50)  perindopril 8 mg oral tablet: 1 tab(s) orally once a day (19 Jun 2019 11:41)  polyethylene glycol 3350 oral powder for reconstitution: 17 gram(s) orally once a day (20 Jun 2019 11:50)  pregabalin 150 mg oral capsule: 1 cap(s) orally 2 times a day (19 Jun 2019 11:41)  Oxycodone 15mg ER once daily   Oxycodone HCL 15mg six times daily  Oxycodone HCL 5mg four times daily   Risperidone 0.5mg BID  Skelaxin 800 mg oral tablet: 1 tab(s) orally 3 times a day, As Needed (19 Jun 2019 11:42)  Trentelex 30mg once daily

## 2021-12-04 NOTE — ED ADULT NURSE REASSESSMENT NOTE - NS ED NURSE REASSESS COMMENT FT1
patient irritable about medication that were ordered.  patient becoming increasingly upset and states "you're not giving me my medication that I need"  Explained to patient that sometimes meds are changed in the hospital setting and then he states that if he does not get 100mg of Trazadone like he normal takes es not going to take any and will be up all night.  Dr. Valenzuela made aware and additional 50 mg ordered and given.

## 2021-12-04 NOTE — ED PROVIDER NOTE - PROGRESS NOTE DETAILS
Patient signed out to me by Dr. Nur- concern for suicidal ideation. Patient placed on 1:1 and pending psychiatry evaluation. Patient moved to  area, became very agitated, screaming at myself and staff. Offered Xanax 1mg PO which he accepted. Home meds restarted with exception of amphetamine and trintellix (nonformulary). Pending Psychiatry recommendations. Noa Bansal DO Seen by psychiatry- plan for acute inpatient admission. Noa Bansal DO

## 2021-12-04 NOTE — ED BEHAVIORAL HEALTH ASSESSMENT NOTE - VIOLENCE RISK FACTORS:
Antisocial behavior/cognition (past or present)/Violent ideation/threat/speech/Substance abuse/Irritability/Firearm/weapon access

## 2021-12-05 PROCEDURE — 99226: CPT

## 2021-12-05 RX ORDER — ONDANSETRON 8 MG/1
4 TABLET, FILM COATED ORAL ONCE
Refills: 0 | Status: COMPLETED | OUTPATIENT
Start: 2021-12-05 | End: 2021-12-05

## 2021-12-05 RX ADMIN — RISPERIDONE 0.5 MILLIGRAM(S): 4 TABLET ORAL at 06:42

## 2021-12-05 RX ADMIN — Medication 50 MILLIGRAM(S): at 22:18

## 2021-12-05 RX ADMIN — Medication 1 MILLIGRAM(S): at 17:42

## 2021-12-05 RX ADMIN — Medication 30 MILLIGRAM(S): at 08:36

## 2021-12-05 RX ADMIN — Medication 2 MILLIGRAM(S): at 01:07

## 2021-12-05 RX ADMIN — OXYCODONE HYDROCHLORIDE 15 MILLIGRAM(S): 5 TABLET ORAL at 21:20

## 2021-12-05 RX ADMIN — RISPERIDONE 0.5 MILLIGRAM(S): 4 TABLET ORAL at 17:43

## 2021-12-05 RX ADMIN — LISINOPRIL 20 MILLIGRAM(S): 2.5 TABLET ORAL at 06:42

## 2021-12-05 RX ADMIN — Medication 2 MILLIGRAM(S): at 23:58

## 2021-12-05 RX ADMIN — OXYCODONE HYDROCHLORIDE 15 MILLIGRAM(S): 5 TABLET ORAL at 23:55

## 2021-12-05 RX ADMIN — ONDANSETRON 4 MILLIGRAM(S): 8 TABLET, FILM COATED ORAL at 23:47

## 2021-12-05 RX ADMIN — PANTOPRAZOLE SODIUM 40 MILLIGRAM(S): 20 TABLET, DELAYED RELEASE ORAL at 06:41

## 2021-12-05 RX ADMIN — Medication 30 MILLIGRAM(S): at 22:19

## 2021-12-05 RX ADMIN — Medication 1 MILLIGRAM(S): at 06:42

## 2021-12-05 RX ADMIN — OXYCODONE HYDROCHLORIDE 15 MILLIGRAM(S): 5 TABLET ORAL at 22:24

## 2021-12-05 RX ADMIN — OXYCODONE HYDROCHLORIDE 15 MILLIGRAM(S): 5 TABLET ORAL at 20:17

## 2021-12-05 RX ADMIN — HALOPERIDOL DECANOATE 5 MILLIGRAM(S): 100 INJECTION INTRAMUSCULAR at 01:07

## 2021-12-05 RX ADMIN — Medication 50 MILLIGRAM(S): at 01:07

## 2021-12-05 RX ADMIN — OXYCODONE HYDROCHLORIDE 15 MILLIGRAM(S): 5 TABLET ORAL at 09:30

## 2021-12-05 RX ADMIN — Medication 50 MILLIGRAM(S): at 23:59

## 2021-12-05 RX ADMIN — Medication 20 MILLIGRAM(S): at 15:32

## 2021-12-05 RX ADMIN — OXYCODONE HYDROCHLORIDE 15 MILLIGRAM(S): 5 TABLET ORAL at 15:33

## 2021-12-05 RX ADMIN — OXYCODONE HYDROCHLORIDE 15 MILLIGRAM(S): 5 TABLET ORAL at 16:30

## 2021-12-05 RX ADMIN — PANTOPRAZOLE SODIUM 40 MILLIGRAM(S): 20 TABLET, DELAYED RELEASE ORAL at 17:42

## 2021-12-05 RX ADMIN — OXYCODONE HYDROCHLORIDE 15 MILLIGRAM(S): 5 TABLET ORAL at 08:46

## 2021-12-05 NOTE — ED ADULT NURSE REASSESSMENT NOTE - NS ED NURSE REASSESS COMMENT FT1
assumed care of pt sleeping/resting in NAD. pt easily aroused and compliant for am vitals, breakfast left at bedside.

## 2021-12-05 NOTE — ED ADULT NURSE REASSESSMENT NOTE - NS ED NURSE REASSESS COMMENT FT1
patient becoming increased agitated over the fact that he was not sleeping and that he was not given th correct doses of medication.   Attempted to explain to patient that doses an time f medications in the hospital are different then at home.  He proceed to say that he has medication prescribed by his dr for many years and should to given what he takes at home.  P again preceded and having increased agitation .  Pt medicated with Benadryl 50m Ativan 2 mg and Haldol. 5 mg IM as ordered and patient states "well I hope I can sleep with this" room darken and door closed.  Will monitor and chart changes and maintain safe environment

## 2021-12-05 NOTE — ED ADULT NURSE REASSESSMENT NOTE - NS ED NURSE REASSESS COMMENT FT1
pt medicated with baclofen per stranding orders. pt c/o back pain 6/10 and requesting pain medication.

## 2021-12-05 NOTE — ED ADULT NURSE REASSESSMENT NOTE - NS ED NURSE REASSESS COMMENT FT1
pt awake, alert and oriented x3, c/o chronic back pain. educated on plan of care and pain management medication orders.  pt observed ambulating on his own with no assistive devices with a steady. pt requested and provided sundries to shower. pt denies suicidal or homicidal ideations and auditory or visual hallucinations. pt has made no attempts to harm himself or others.

## 2021-12-05 NOTE — ED ADULT NURSE REASSESSMENT NOTE - NS ED NURSE REASSESS COMMENT FT1
assumed care of patient.  Pt alert and cooperative. requesting pain medication earlier.,  Pt states having terrible back pain  Medicated with Oxy IR 15 mg as requested.  Will monitor and chart changes  Pt out of bed to nurses station with steady gait will monitor and chart changed.

## 2021-12-05 NOTE — ED BEHAVIORAL HEALTH NOTE - BEHAVIORAL HEALTH NOTE
Social work note: Pt is in need of inpatient admission. Worker contacted [facility] (spoke with Salvador), no beds available. SW also called [facility] (Spoke with Dominik), no beds available. Called [facility] (spoke with Sania), no beds available. SW called [facility] (spoke with wes rhodes), no beds available.  Worker contacted [facility], and spoke with Tram who told this writer that admissions opens at 11am. SW to call back then.

## 2021-12-06 VITALS — SYSTOLIC BLOOD PRESSURE: 155 MMHG | DIASTOLIC BLOOD PRESSURE: 91 MMHG

## 2021-12-06 PROCEDURE — G0378: CPT

## 2021-12-06 PROCEDURE — 99285 EMERGENCY DEPT VISIT HI MDM: CPT | Mod: 25

## 2021-12-06 PROCEDURE — U0003: CPT

## 2021-12-06 PROCEDURE — 93005 ELECTROCARDIOGRAM TRACING: CPT

## 2021-12-06 PROCEDURE — 96372 THER/PROPH/DIAG INJ SC/IM: CPT | Mod: XU

## 2021-12-06 PROCEDURE — U0005: CPT

## 2021-12-06 PROCEDURE — 96374 THER/PROPH/DIAG INJ IV PUSH: CPT

## 2021-12-06 PROCEDURE — 36415 COLL VENOUS BLD VENIPUNCTURE: CPT

## 2021-12-06 PROCEDURE — 80048 BASIC METABOLIC PNL TOTAL CA: CPT

## 2021-12-06 PROCEDURE — 99217: CPT

## 2021-12-06 PROCEDURE — 80307 DRUG TEST PRSMV CHEM ANLYZR: CPT

## 2021-12-06 PROCEDURE — 85025 COMPLETE CBC W/AUTO DIFF WBC: CPT

## 2021-12-06 RX ORDER — ALPRAZOLAM 0.25 MG
1 TABLET ORAL ONCE
Refills: 0 | Status: DISCONTINUED | OUTPATIENT
Start: 2021-12-06 | End: 2021-12-06

## 2021-12-06 RX ADMIN — Medication 1 MILLIGRAM(S): at 04:05

## 2021-12-06 RX ADMIN — OXYCODONE HYDROCHLORIDE 15 MILLIGRAM(S): 5 TABLET ORAL at 03:30

## 2021-12-06 RX ADMIN — PANTOPRAZOLE SODIUM 40 MILLIGRAM(S): 20 TABLET, DELAYED RELEASE ORAL at 06:53

## 2021-12-06 RX ADMIN — Medication 30 MILLIGRAM(S): at 09:23

## 2021-12-06 RX ADMIN — Medication 1 MILLIGRAM(S): at 09:11

## 2021-12-06 RX ADMIN — RISPERIDONE 0.5 MILLIGRAM(S): 4 TABLET ORAL at 06:52

## 2021-12-06 RX ADMIN — LISINOPRIL 20 MILLIGRAM(S): 2.5 TABLET ORAL at 07:01

## 2021-12-06 RX ADMIN — HALOPERIDOL DECANOATE 5 MILLIGRAM(S): 100 INJECTION INTRAMUSCULAR at 00:24

## 2021-12-06 RX ADMIN — OXYCODONE HYDROCHLORIDE 15 MILLIGRAM(S): 5 TABLET ORAL at 11:58

## 2021-12-06 NOTE — ED CDU PROVIDER SUBSEQUENT DAY NOTE - MEDICAL DECISION MAKING DETAILS
57yo male with aggressive behavior- plan for inpatient psychiatric management.
57yo male with aggressive behavior- plan for

## 2021-12-06 NOTE — ED ADULT NURSE REASSESSMENT NOTE - NS ED NURSE REASSESS COMMENT FT1
Assumed care of patient at 0730.  Patient oriented to staff and educated about plan of care.  Patient reports he did not sleep last night and mood is irritable.  No attempts to harm self or others.  Safety of patient maintained.

## 2021-12-06 NOTE — ED ADULT NURSE REASSESSMENT NOTE - NS ED NURSE REASSESS COMMENT FT1
as per [facility] admission Alonso reports pt OK for transfer. EMS aware. pt with no complaints, calm and cooperative with vitals at baseline.

## 2021-12-06 NOTE — ED CDU PROVIDER SUBSEQUENT DAY NOTE - NSICDXPASTSURGICALHX_GEN_ALL_CORE_FT
PAST SURGICAL HISTORY:  H/O colonoscopy (2013)    H/O hand surgery right hand 5/2019 for a fall/injury - screws implant    H/O inguinal hernia repair right (1998)    H/O total knee replacement (1998) multiple other knee surgeries    History of tonsillectomy (1975)    
PAST SURGICAL HISTORY:  H/O colonoscopy (2013)    H/O hand surgery right hand 5/2019 for a fall/injury - screws implant    H/O inguinal hernia repair right (1998)    H/O total knee replacement (1998) multiple other knee surgeries    History of tonsillectomy (1975)

## 2021-12-06 NOTE — ED CDU PROVIDER SUBSEQUENT DAY NOTE - HISTORY
Patient with mild to moderate anxiety requiring additional dose of xanax to aide with symptoms. Otherwise has been stable.
Boom: No events overnight. Awaiting psychiatric disposition

## 2021-12-06 NOTE — ED ADULT NURSE REASSESSMENT NOTE - NS ED NURSE REASSESS COMMENT FT1
Called placed to Dr Tobar patient requesting more medication for sleep. Pt informed that  had been called.  Pt walking on unit and cursing that he still isnt asleep.  Pt states we are treating him like a dog and that we want him to suffer. Spoke with patient and informed him that the language is unacceptable and that as soon as I hear from   I would let him know.  Pt remains irritated

## 2021-12-06 NOTE — ED CDU PROVIDER SUBSEQUENT DAY NOTE - NSICDXPASTMEDICALHX_GEN_ALL_CORE_FT
PAST MEDICAL HISTORY:  Arthritis     Back pain     Hypertension     
PAST MEDICAL HISTORY:  Arthritis     Back pain     Hypertension

## 2021-12-06 NOTE — ED ADULT NURSE REASSESSMENT NOTE - NS ED NURSE REASSESS COMMENT FT1
patient aggitated  and becoming loud and distruptive becauses he is unable to sleep. patient medicated with Benadryl, Haldol and Ativan as ordered for aggitation 00;00 patient aggitated  and becoming loud and disruptive because he is unable to sleep. patient medicated with Benadryl, Haldol and Ativan as ordered for agitation.

## 2021-12-06 NOTE — ED CDU PROVIDER SUBSEQUENT DAY NOTE - NSICDXFAMILYHX_GEN_ALL_CORE_FT
FAMILY HISTORY:  Father  Still living? Yes, Estimated age: Age Unknown  Family history of diabetes mellitus, Age at diagnosis: Age Unknown    Sibling  Still living? Yes, Estimated age: Age Unknown  Family history of diabetes mellitus, Age at diagnosis: Age Unknown    
FAMILY HISTORY:  Father  Still living? Yes, Estimated age: Age Unknown  Family history of diabetes mellitus, Age at diagnosis: Age Unknown    Sibling  Still living? Yes, Estimated age: Age Unknown  Family history of diabetes mellitus, Age at diagnosis: Age Unknown

## 2021-12-06 NOTE — ED ADULT NURSE REASSESSMENT NOTE - NS ED NURSE REASSESS COMMENT FT1
Patient provided tray at bedside.  Patient educated about plan of care including transfer to [facility]. No attempts to harm self or others and safety maintained.

## 2021-12-06 NOTE — ED CDU PROVIDER SUBSEQUENT DAY NOTE - PHYSICAL EXAMINATION
Gen: agitated. aox3  Head: NCAT  HEENT: oral mucosa moist, normal conjunctiva, oropharynx clear without exudate or erythema  Lung: CTAB, no respiratory distress, no wheezing, rales, rhonchi  CV: normal s1/s2, rrr, no murmurs, Normal perfusion, pulses 2+ throughout  Abd: soft, NTND  MSK: No edema, no visible deformities, full range of motion in all 4 extremities  Neuro: No focal neurologic deficits  Skin: No rash   Psych: elevated speech. labile affect
Gen: agitated. aox3  Head: NCAT  HEENT: oral mucosa moist, normal conjunctiva, oropharynx clear without exudate or erythema  Lung: CTAB, no respiratory distress, no wheezing, rales, rhonchi  CV: normal s1/s2, rrr, no murmurs, Normal perfusion, pulses 2+ throughout  Abd: soft, NTND  MSK: No edema, no visible deformities, full range of motion in all 4 extremities  Neuro: No focal neurologic deficits  Skin: No rash   Psych: elevated speech. labile affect

## 2021-12-07 NOTE — CHART NOTE - NSCHARTNOTEFT_GEN_A_CORE
SW Note: Pt has been accepted for admit to [facility]. Accepting Dr. Dior. legals 9.37. Notified pts spouse, Micheline [phone]. [facility] arranged with [facility]. [facility] letter sent with pt in transfer packet. Auth needed for admit.
SWNote: pt needs transfer 9.37 ,no bed available at any hospital this evening. Faxed to [facility] for possible bed in the am. SW to follow.
SW Note: Completed ins auth for admit to [facility]. Called [facility] [phone], spoke to zaida. Auth approved for 30 days 12/6-1/5. Auth# [id number]. Info forwarded to [facility] UR dept.

## 2022-04-20 ENCOUNTER — APPOINTMENT (OUTPATIENT)
Dept: ORTHOPEDIC SURGERY | Facility: CLINIC | Age: 57
End: 2022-04-20

## 2022-09-08 ENCOUNTER — FORM ENCOUNTER (OUTPATIENT)
Age: 57
End: 2022-09-08

## 2022-09-09 ENCOUNTER — APPOINTMENT (OUTPATIENT)
Dept: MRI IMAGING | Facility: CLINIC | Age: 57
End: 2022-09-09

## 2022-09-09 ENCOUNTER — APPOINTMENT (OUTPATIENT)
Dept: ORTHOPEDIC SURGERY | Facility: CLINIC | Age: 57
End: 2022-09-09

## 2022-09-09 VITALS — HEIGHT: 70 IN | BODY MASS INDEX: 37.22 KG/M2 | WEIGHT: 260 LBS

## 2022-09-09 PROCEDURE — 99072 ADDL SUPL MATRL&STAF TM PHE: CPT

## 2022-09-09 PROCEDURE — 73130 X-RAY EXAM OF HAND: CPT | Mod: RT

## 2022-09-09 PROCEDURE — 99214 OFFICE O/P EST MOD 30 MIN: CPT

## 2022-09-09 PROCEDURE — 73080 X-RAY EXAM OF ELBOW: CPT | Mod: RT

## 2022-09-09 PROCEDURE — 73221 MRI JOINT UPR EXTREM W/O DYE: CPT | Mod: RT

## 2022-09-09 NOTE — HISTORY OF PRESENT ILLNESS
[8] : 8 [7] : 7 [Dull/Aching] : dull/aching [de-identified] : He fell on R hand and elbow after knee became unstable\par  [] : no [FreeTextEntry1] : right hand,elbow [FreeTextEntry3] : AUG 2022 [FreeTextEntry5] : his knee buckled and fell down.

## 2022-09-09 NOTE — ASSESSMENT
[FreeTextEntry1] : I rec MRI to evalaute triceps \par Return after MRI\par Cosnder aspiration/injection vs surgery

## 2022-09-09 NOTE — PHYSICAL EXAM
[de-identified] : R elbow\par Swelling\par Tender olecrnaon\par ?defect triceps\par \par Xrays avulsion fx\par \par R hand\par Mild swelling\par Stiffness\par Tender\par \par Xrays no acute fx

## 2022-09-12 ENCOUNTER — APPOINTMENT (OUTPATIENT)
Dept: ORTHOPEDIC SURGERY | Facility: CLINIC | Age: 57
End: 2022-09-12

## 2022-09-12 VITALS — HEIGHT: 70 IN | WEIGHT: 260 LBS | BODY MASS INDEX: 37.22 KG/M2

## 2022-09-12 PROCEDURE — 20605 DRAIN/INJ JOINT/BURSA W/O US: CPT

## 2022-09-12 PROCEDURE — 99214 OFFICE O/P EST MOD 30 MIN: CPT | Mod: 25

## 2022-09-12 NOTE — ASSESSMENT
[FreeTextEntry1] : Aspirated 10cc under aseptic conditions\par Injected 2 cc celestone\par He tolerated it well\par Wrapped with ACE\par Return in 4 weeks- consider surgery

## 2022-09-12 NOTE — PHYSICAL EXAM
[de-identified] : R elbow\par Swelling\par Tender olecrnaon, effusion\par No defect triceps\par \par \par \par R hand\par Mild swelling\par Stiffness\par Tender\par

## 2022-09-12 NOTE — HISTORY OF PRESENT ILLNESS
[Work related] : work related [8] : 8 [7] : 7 [Dull/Aching] : dull/aching [Not working due to injury] : Work status: not working due to injury [] : yes [de-identified] : R elbow MRI shows olecranon bursitis, triceps tendonopathy\par  [FreeTextEntry1] : right elbow [FreeTextEntry5] : pain is the same\par

## 2022-09-22 ENCOUNTER — APPOINTMENT (OUTPATIENT)
Dept: ORTHOPEDIC SURGERY | Facility: CLINIC | Age: 57
End: 2022-09-22

## 2022-09-22 VITALS — WEIGHT: 280 LBS | BODY MASS INDEX: 40.09 KG/M2 | HEIGHT: 70 IN

## 2022-09-22 PROCEDURE — 99214 OFFICE O/P EST MOD 30 MIN: CPT | Mod: 57

## 2022-09-22 PROCEDURE — 99072 ADDL SUPL MATRL&STAF TM PHE: CPT

## 2022-09-22 NOTE — HISTORY OF PRESENT ILLNESS
[Work related] : work related [10] : 10 [Not working due to injury] : Work status: not working due to injury [de-identified] : R olecranon is not any better from injection  [FreeTextEntry1] : r elbow [de-identified] : none

## 2022-09-22 NOTE — ASSESSMENT
[FreeTextEntry1] : For R olecranon bursectomy\par R/B/A of surgery discussed with the patient. Risks including but not limited to infection, nerve damage, tendon damage, pain, stiffness, recurrence, no resolution of symptoms, loss of function, limb or life. They understand and agree to surgery \par Return post op

## 2022-09-22 NOTE — PHYSICAL EXAM
[de-identified] : R elbow\par Swelling\par Tender olecrnaon, effusion\par No defect triceps\par \par \par \par R hand\par Mild swelling\par Stiffness\par Tender\par

## 2022-10-11 ENCOUNTER — APPOINTMENT (OUTPATIENT)
Dept: ORTHOPEDIC SURGERY | Facility: CLINIC | Age: 57
End: 2022-10-11

## 2022-10-11 VITALS — WEIGHT: 280 LBS | BODY MASS INDEX: 40.09 KG/M2 | HEIGHT: 70 IN

## 2022-10-11 DIAGNOSIS — T84.018D BROKEN INTERNAL JOINT PROSTHESIS, OTHER SITE, SUBSEQUENT ENCOUNTER: ICD-10-CM

## 2022-10-11 DIAGNOSIS — R79.89 OTHER SPECIFIED ABNORMAL FINDINGS OF BLOOD CHEMISTRY: ICD-10-CM

## 2022-10-11 DIAGNOSIS — Z96.659 BROKEN INTERNAL JOINT PROSTHESIS, OTHER SITE, SUBSEQUENT ENCOUNTER: ICD-10-CM

## 2022-10-11 PROCEDURE — 73562 X-RAY EXAM OF KNEE 3: CPT | Mod: 50

## 2022-10-11 PROCEDURE — 99072 ADDL SUPL MATRL&STAF TM PHE: CPT

## 2022-10-11 PROCEDURE — 99214 OFFICE O/P EST MOD 30 MIN: CPT

## 2022-10-11 NOTE — IMAGING
[Left] : left knee [de-identified] : Right Knee: Inspection of the knee is as follows: mild effusion. Knee Range of Motion is as follows: Extension: 0 degrees. Flexion: 100 degrees. \par \par Left Knee: Inspection of the knee is as follows: mild effusion. Palpation of the knee is as follows: crepitus. Knee Range of Motion is as follows: "clunk with ROM" \par \par  [FreeTextEntry9] : patella baja

## 2022-10-11 NOTE — HISTORY OF PRESENT ILLNESS
[Work related] : work related [8] : 8 [4] : 4 [Dull/Aching] : dull/aching [Constant] : constant [Household chores] : household chores [Leisure] : leisure [Meds] : meds [Ice] : ice [Heat] : heat [Sitting] : sitting [Standing] : standing [Walking] : walking [Not working due to injury] : Work status: not working due to injury [] : yes [de-identified] : 10/11/22: He is continuing to have worsening left knee pain. Continues to clunk \par \par Prev Doc:\par WC 11/17/15.\par 1/8/18: Patient known to me for left rev TKA for instability July 2017. Had postop INGRID which he felt helped his function but not his pain. Feels pain is worsening over the past 2 months. Known adv OA right knee. Increased lyrica since last visit which did not help.\par 2/5 he is having more pain and feels has new reddness lateral sup knee- he is having pain walking and wt bearing- rt knee is severe as well gave out on him and needs brace all the time - -\par 4/16/18: Still having difficulty with both knees. Going to PT and feels he is improving. Working on weight loss.\par 5/14/18: Has been doing well with weight loss, right knee inj helped a lot. Feels left knee pain is worsening.\par 6/18 rt knee is poping all to the time and leg giving on him - needs brace all the time- Left knee is stable but he has pain lateral with motion-\par 7/16/18: Left knee still some pain and clicking. Right knee had short term relief from inj.\par 8/20/18: Both knees still have pain. Right knee completed orthovisc last week which did not help that much.\par 10/1/18: Continued pain in both knees, right knee is getting worse.\par 12/3/18: Right knee pain is worsening since last time. Left popliteus inj helped.\par 1/7/19: Had CT right knee done. Waiting for TKA auth.\par 2/11/19: Worsening left knee pain and feels swollen x 2 weeks. He has been on his feet a lot with recent death in the family. Right knee continued pain - surgery auth pending.\par 3/11/19: Continued pain in both knees. Asp helped left knee. Right knee cont pain - had hearing 2 weeks ago and was told it was approved.\par 6/25 he took a fall after surgery and went to ED - inc ok - \par 7/23 he is doing better overall less pain and better motion - thinks it is going well - he has issues with Pain meds but it is because the pain mgt had stopped his meds and he was on 6 oxy a day \par 8/20 he was doing well but had episode of increased pain form knee to foot- he has been weaning his pain meds - oxy 15mg 4-6 daily - \par 9/23 cont to have some pain in the knee some swelling with recent cut on his foot walking with a limp- feels stiff with morning - having some shooting pain but has back isses - working hard with pain but is happy with his results todate \par 10/29 cont pain in the knee and he still has a lot of weakness difficulty to getting out of a chair\par 11/26/19: Right knee still some pain and stiffness, no PT auth lately. Had ZULEYMA last week which helped some of his back pain.\par 1/7/20 doing ok has pain in rt knee but related to ROM only to about 95 - he has sig back and neck and shoulder\par 2/10/20: Stiffness in knees, PT helps.\par 3/16/20: Had increasing leg pain which improved with ZULEYMA but set him back in PT lately.\par 6/15 he is seeing a new pain med doctor - told having sig spine issues - not doing PT and feel very weak\par 7/27/20: Going to PT, feeling better when he goes.\par 9/15/20: Improving with PT. had some increased pain a few days ago but resolving.\par 11/23/20: Recent Lspine surgery 5-6 weeks ago. Right knee is doing well, still with left knee pain.\par 3/2/21: Followed today for bilateral knees, recently fell due to his knee giving out on him and fractured his wrist. Being treated by Dr. Prado.\par 1/31/22: F/U with pain in b/l knees. Pain in the right knee has slowly getting better. Left knee is progressively getting worse. [FreeTextEntry1] : bilateral knees [FreeTextEntry3] : 11/17/2015 [FreeTextEntry5] : Pt is here to follow up on bilateral knees. Pt states left knee is worse than right. Pt states swelling after some walking, and radiates down shin. Pt is currently taking oxycodone daily.  [FreeTextEntry7] : lt shin

## 2022-10-11 NOTE — DISCUSSION/SUMMARY
[de-identified] : We discussed my findings and the natural history of their condition.  We talked about the details of the proposed surgery and the recovery.  We discussed the material risks, possible benefits and alternatives to surgery.  The risks include but are not limited to infection, bleeding and possible need for blood transfusion, fracture, bowel blockage, bladder retention or infection, need for reoperation, stiffness and/or limited range of motion, possible damage to nerves and blood vessels, failure of fixation of components, risk of deep vein thromboses and pulmonary embolism, wound healing problems, dislocation, and possible leg length discrepancy.  Although incredibly rare, we also discussed the risks of a cardiac event, stroke and even death during, or following, the surgery.  We discussed the type of implants the patient will be receiving and the type of fixation that will be used, as well as whether a robot or computer navigation aide will be used.  The patient understands they will need medical clearance.  We also discussed the type of anesthesia they will receive, and the risks associated with hospital or rehab length of stay, obesity, diabetes and smoking.

## 2022-10-11 NOTE — ASSESSMENT
[FreeTextEntry1] : Prev Doc:\par 1/8/18: Left rev TKA July 2017 - flexion to 100 today but still with neurologic symptoms in leg. Recc continue PT and weight loss.\par 2/5 cont to have lateral knee pain and sig guarding on the left knee I think when his pain improves his motion will improve - wt loss is going well - cont PT on the knee I feel the lateral knee is likely due to some bone healing on lateral fem chondyle - Left knee cont to give out on him and has feelings of instability in the rt knee need brace all the time - he Is not ready for TKA yet due to the trouble so n his left knee\par 4/16/18: Still with left knee pain and stiffness - continue with PT/HEP and weight loss. Right knee pain from OA - inj today.\par 5/14/18: He is concerned about increasing pain recently. No effusion or instability and his ROM is to 110 today which is better than in the past. Unable to get XRs today - if pain worsens will return for XRs however if he is improving will continue with PT and reeval in 1 month.\par 6/18 LEft knee still painful with lateral pain and some patella crepitus - but motin is ok an mild/mod swelling - left knee is more severe and pan is very bad with a lot of popping- I will give him steroid inj today due to severe pain\par 7/16/18: Left knee still with pain but stable and has good function. Right knee short term relief from cortisone - will try HA injections as he is trying to delay TKA.\par 8/20/18: Still with pain in both knees - completed orthovisc right knee last week so too soon to say if it helped. Left knee still with lateral pain along IT band recc continue PT and stretching for this.\par 10/1/18: Still with pain in both knees - right knee has progressed to adv OA on images taken with Dr. Javier last week. Hesitant to have right TKA as he is still having difficulty with left knee -havig some pain at [place] may this si sanpping giving pain we will try inj here today\par 12/3/18: Left knee still with pain - working on getting second opinion. Right knee with adv OA and severe pain - given his difficulty with his left knee surgeries I recc we proceed with right TKA conformis implants. Need CT for surgical planning.\par 1/7/19: CT reviewed - proceed with right TKA conformis when authorized.\par 2/11/19: Left knee worsening pain and effusion could be from overuse - asp 5cc clear yellow fluid and sent to lab. Right knee cont pain and surgery auth pending, has hearing later this month.\par 3/11/19: No change - states he got auth for right TKA will plan ASAP. Had CT done already for conformis.\par 6/25 motion is OK no injury from fall cont to work with PT Dilaudid helped more than oxy\par 7/23 he is doing well - mild pain stopped dilaudid and will finish oxy 15mg 6 times a day - then pain mgt will take over - the mult calls for meds an pain were due to confusion on who was getting his meds he was using his base dose of oxy with diladid breakthough - 14 days worth\par 8/20 he si doing well with rt knee and had severe calf pain so we will get Doppler no DVT -\par 9/23 he is dong ok and progressing - but still has pain - he feels he is not able to work - he is doing some light HEP - he is doing well with PT and we will cont this - having trouble with sit to stand due to quad weakness =- - oxy 15 mg #180 seeing painmgt for this - He also notes pain in his back neck and rt hand left arm and hand as well as left foot\par 10/29 he is doing better with PT and PT is helping his ROM and his strength - knee feel stiff to him and better after start walking -he is still unable to get out of chair without help his motion is only 3-100 and he still need a cane to walk and weak quads I feel it is important to cont with PT and build strength and ROM\par 11/26/19: Still some stiffness and weakness in knee. Still some difficulty with left knee also but has ongoing issues with his back which is most severe currently. I think he would still benefit from PT to work on ROM and strength.\par 1/7/20 he is doing ok - his rt knee is still stiff and motion only to about  he is still working at home and with PT for this - left knee feels ok but still having clunking and will need a partial synovectomy at some point\par 2/10/20: Doing ok - recc cont PT to work on ROM and strength. Left knee stable.\par 3/16/20: Had increasing leg pain related to Lspine which is now better since ZULEYMA - restart PT to get him back on track.\par 6/15 still has some pain in both knees as well as sig weakness- - he is getting cont w/u and treatment for his L spine - cont PT or now\par 7/27/20: No change - cont PT as this is helping and he is seeing pain management for his back.\par 9/15/20: Showing improvement - cont PT. He is happy with his right knee progress. Still some weakness in left leg and recc cont PT and pain management.\par 11/23/20: Knees unchanged - had recent Lspine surgery and will restart PT when cleared by spine surgeon. Will give rx for PT today.\par 3/2/21: Continues to have issues with stability in legs and quad weakness due to his multiple surgeries with recent fall causing him to break his wrist. Recommend PT for quad strengthening and mobility.\par 1/31/22: Patient with patella clunk and effusion - Will plan for synovetomy and at that time look at patella tracking and possible femoral loosening. He has discussed this for some time but due to multiple spine surgies had to delay until now. He has persistent issues with his left knee, at this time his right knee is doing better than his left.\par \par 10/11/22: He continues to have persistent issues with the knees. Will plan for synovetomy and at that time look at patella tracking and possible femoral loosening - planning for this after his elbow surgery

## 2022-10-13 ENCOUNTER — FORM ENCOUNTER (OUTPATIENT)
Age: 57
End: 2022-10-13

## 2022-11-08 ENCOUNTER — FORM ENCOUNTER (OUTPATIENT)
Age: 57
End: 2022-11-08

## 2022-11-24 ENCOUNTER — APPOINTMENT (OUTPATIENT)
Dept: ORTHOPEDIC SURGERY | Facility: CLINIC | Age: 57
End: 2022-11-24
Payer: COMMERCIAL

## 2022-11-24 VITALS — HEIGHT: 70 IN | BODY MASS INDEX: 40.09 KG/M2 | WEIGHT: 280 LBS

## 2022-11-24 DIAGNOSIS — M75.31 CALCIFIC TENDINITIS OF RIGHT SHOULDER: ICD-10-CM

## 2022-11-24 DIAGNOSIS — M75.51 BURSITIS OF RIGHT SHOULDER: ICD-10-CM

## 2022-11-24 DIAGNOSIS — M54.12 RADICULOPATHY, CERVICAL REGION: ICD-10-CM

## 2022-11-24 PROCEDURE — 72040 X-RAY EXAM NECK SPINE 2-3 VW: CPT | Mod: ACP

## 2022-11-24 PROCEDURE — 73030 X-RAY EXAM OF SHOULDER: CPT | Mod: ACP,RT

## 2022-11-24 PROCEDURE — 99072 ADDL SUPL MATRL&STAF TM PHE: CPT | Mod: ACP

## 2022-11-24 PROCEDURE — 73010 X-RAY EXAM OF SHOULDER BLADE: CPT | Mod: ACP,RT

## 2022-11-24 NOTE — IMAGING
[Straightening consistent with spasm] : Straightening consistent with spasm [Right] : right shoulder [FreeTextEntry1] : severe ac oa and small calcific deposit noted rc distribution

## 2022-11-24 NOTE — PROCEDURE
[Large Joint Injection] : Large joint injection [Right] : of the right [Subacromial Space] : subacromial space [AC Joint] : ac joint [Pain] : pain [Inflammation] : inflammation [Alcohol] : alcohol [Betadine] : betadine [Ethyl Chloride sprayed topically] : ethyl chloride sprayed topically [Sterile technique used] : sterile technique used [___ cc    6mg] :  Betamethasone (Celestone) ~Vcc of 6mg [___ cc    1%] : Lidocaine ~Vcc of 1%  [] : Patient tolerated procedure well [Call if redness, pain or fever occur] : call if redness, pain or fever occur [Apply ice for 15min out of every hour for the next 12-24 hours as tolerated] : apply ice for 15 minutes out of every hour for the next 12-24 hours as tolerated [Patient was advised to rest the joint(s) for ____ days] : patient was advised to rest the joint(s) for [unfilled] days [Previous OTC use and PT nontherapeutic] : patient has tried OTC's including aspirin, Ibuprofen, Aleve, etc or prescription NSAIDS, and/or exercises at home and/or physical therapy without satisfactory response [Patient had decreased mobility in the joint] : patient had decreased mobility in the joint [Risks, benefits, alternatives discussed / Verbal consent obtained] : the risks benefits, and alternatives have been discussed, and verbal consent was obtained

## 2022-11-24 NOTE — HISTORY OF PRESENT ILLNESS
[10] : 10 [Localized] : localized [Sharp] : sharp [Constant] : constant [Work related] : work related [Not working due to injury] : Work status: not working due to injury [de-identified] : 11-24-22- He has seen Dr Prado last in september for right olecranon bursitis. He states over the last 24 hrs has developed severe pain with limited range of motion mostly right shoulder but pain radiating throughout the entire right upper extremity into the right hand. He notes some mild effusion in the right elbow. He notes he was washing windows in his house yesterday prior to onset of pain and limited range of motion. He initially called his pcp who put him on medrol pack which he started earlier this am. \par Denies erythema, or warmth to the elbow.\par \par He does state swelling in the right upper extremity.  [] : Post Surgical Visit: no [FreeTextEntry1] : r elbow [FreeTextEntry3] : 11/23/22 [FreeTextEntry5] : Patient is here with elbow pain, getting surgery with Dr. Prado,  [de-identified] : none

## 2022-11-24 NOTE — REVIEW OF SYSTEMS
RN and RT placing pt on high flow nasal cannula. While placing pt on HFNC, pt not responding to RN or RT. No response to painful stimuli other than minimal groaning. RT calling rapid response. [Joint Pain] : joint pain [Negative] : Heme/Lymph [Joint Stiffness] : joint stiffness

## 2022-11-24 NOTE — ASSESSMENT
[FreeTextEntry1] : I believe the majority of his symptoms are from the calcific deposit right shoulder. administered csi laterally into sas for that, also injected into ac joint due to his findings on xray with some ttp.\par discussed the importance of hep and range of motion as he feels some improvement from the csi.\par He will finish the medrol pack from his pcp, f/u next week with DR Javier

## 2022-11-24 NOTE — PHYSICAL EXAM
[Flexion] : flexion [Extension] : extension [Rotation to left] : rotation to left [Rotation to right] : rotation to right [de-identified] : radicular pain throughout entire right upper extremity into the hand.  [4 ___] : forward flexion 4[unfilled]/5 [4___] : internal rotation 4[unfilled]/5 [FreeTextEntry3] : guarding in pain [de-identified] : n/a [Right] : right elbow [] : small olecranon bursal effusion [FreeTextEntry9] : full range of motion elbow [de-identified] : R elbow\par Swelling\par Tender olecrnaon, effusion\par No defect triceps\par \par \par \par R hand\par Mild swelling\par Stiffness\par Tender\par

## 2022-11-30 ENCOUNTER — APPOINTMENT (OUTPATIENT)
Dept: ORTHOPEDIC SURGERY | Facility: CLINIC | Age: 57
End: 2022-11-30

## 2023-01-15 ENCOUNTER — APPOINTMENT (OUTPATIENT)
Dept: ORTHOPEDIC SURGERY | Facility: CLINIC | Age: 58
End: 2023-01-15
Payer: OTHER MISCELLANEOUS

## 2023-01-15 DIAGNOSIS — S30.0XXA CONTUSION OF LOWER BACK AND PELVIS, INITIAL ENCOUNTER: ICD-10-CM

## 2023-01-15 PROCEDURE — 72100 X-RAY EXAM L-S SPINE 2/3 VWS: CPT

## 2023-01-15 PROCEDURE — 99072 ADDL SUPL MATRL&STAF TM PHE: CPT

## 2023-01-15 PROCEDURE — 72170 X-RAY EXAM OF PELVIS: CPT

## 2023-01-15 NOTE — ASSESSMENT
[FreeTextEntry1] : he is advised rest and avoid heavy lifting.  He has pain medication at home.  f/u with Dr Guerrero in 1-2 weeks.

## 2023-01-15 NOTE — PHYSICAL EXAM
[NL (45)] : extension 45 degrees [NL (40)] : right lateral bending 40 degrees [5___] : right extensor hallicus longus 5[unfilled]/5 [Disc space narrowing] : Disc space narrowing [] : non-antalgic [de-identified] : mild oa [TWNoteComboBox7] : forward flexion 45 degrees [de-identified] : extension 20 degrees [de-identified] : left lateral rotation 25 degrees [de-identified] : left lateral bending 20 degrees [de-identified] : right lateral bending 20 degrees [TWNoteComboBox6] : right lateral rotation 25 degrees

## 2023-01-15 NOTE — HISTORY OF PRESENT ILLNESS
[10] : 10 [de-identified] : 57 YM with lower back pain s/p fall at home this morning.  He states that he fell getting up off the toilet and hit the granite counter to on his left side.  He is s/p lumbar fusion several years ago.  He is currently being treated for a right septic shoulder and has a picc line in. [FreeTextEntry5] : pt states he fell in his bathroom today, states radition into legs

## 2023-01-28 ENCOUNTER — APPOINTMENT (OUTPATIENT)
Dept: ORTHOPEDIC SURGERY | Facility: CLINIC | Age: 58
End: 2023-01-28

## 2023-02-11 NOTE — ED CDU PROVIDER DISPOSITION NOTE - NSREASONFORTRANSFER_ED_A_ED
52F with bipolar disorder, hypothyroidism, admitted for acute encephalopathy most likely in the setting of psychosis due to lithium nonadherence with course complicated by acute renal failure and hypothyroidism. Now on risperidone and pending placement to group home. Higher Level of Care or Service Not Available 52F with bipolar disorder, hypothyroidism, admitted for acute encephalopathy most likely in the setting of psychosis due to lithium nonadherence with course complicated by acute renal failure and hypothyroidism, now resolved. Now on risperidone and pending placement to group home. Course c/b mildly symptomatic COVID, monitored off treatment.

## 2023-02-20 ENCOUNTER — APPOINTMENT (OUTPATIENT)
Dept: ORTHOPEDIC SURGERY | Facility: CLINIC | Age: 58
End: 2023-02-20
Payer: OTHER MISCELLANEOUS

## 2023-02-20 VITALS — HEIGHT: 70 IN | BODY MASS INDEX: 40.09 KG/M2 | WEIGHT: 280 LBS

## 2023-02-20 DIAGNOSIS — S83.242D OTHER TEAR OF MEDIAL MENISCUS, CURRENT INJURY, LEFT KNEE, SUBSEQUENT ENCOUNTER: ICD-10-CM

## 2023-02-20 DIAGNOSIS — Z96.651 PRESENCE OF RIGHT ARTIFICIAL KNEE JOINT: ICD-10-CM

## 2023-02-20 PROCEDURE — 99072 ADDL SUPL MATRL&STAF TM PHE: CPT

## 2023-02-20 PROCEDURE — 99214 OFFICE O/P EST MOD 30 MIN: CPT

## 2023-02-20 NOTE — ASSESSMENT
[FreeTextEntry1] : Prev Doc:\par 1/8/18: Left rev TKA July 2017 - flexion to 100 today but still with neurologic symptoms in leg. Recc continue PT and weight loss.\par 2/5 cont to have lateral knee pain and sig guarding on the left knee I think when his pain improves his motion will improve - wt loss is going well - cont PT on the knee I feel the lateral knee is likely due to some bone healing on lateral fem chondyle - Left knee cont to give out on him and has feelings of instability in the rt knee need brace all the time - he Is not ready for TKA yet due to the trouble so n his left knee\par 4/16/18: Still with left knee pain and stiffness - continue with PT/HEP and weight loss. Right knee pain from OA - inj today.\par 5/14/18: He is concerned about increasing pain recently. No effusion or instability and his ROM is to 110 today which is better than in the past. Unable to get XRs today - if pain worsens will return for XRs however if he is improving will continue with PT and reeval in 1 month.\par 6/18 LEft knee still painful with lateral pain and some patella crepitus - but motin is ok an mild/mod swelling - left knee is more severe and pan is very bad with a lot of popping- I will give him steroid inj today due to severe pain\par 7/16/18: Left knee still with pain but stable and has good function. Right knee short term relief from cortisone - will try HA injections as he is trying to delay TKA.\par 8/20/18: Still with pain in both knees - completed orthovisc right knee last week so too soon to say if it helped. Left knee still with lateral pain along IT band recc continue PT and stretching for this.\par 10/1/18: Still with pain in both knees - right knee has progressed to adv OA on images taken with Dr. Javier last week. Hesitant to have right TKA as he is still having difficulty with left knee -havig some pain at [place] may this si sanpping giving pain we will try inj here today\par 12/3/18: Left knee still with pain - working on getting second opinion. Right knee with adv OA and severe pain - given his difficulty with his left knee surgeries I recc we proceed with right TKA conformis implants. Need CT for surgical planning.\par 1/7/19: CT reviewed - proceed with right TKA conformis when authorized.\par 2/11/19: Left knee worsening pain and effusion could be from overuse - asp 5cc clear yellow fluid and sent to lab. Right knee cont pain and surgery auth pending, has hearing later this month.\par 3/11/19: No change - states he got auth for right TKA will plan ASAP. Had CT done already for conformis.\par 6/25 motion is OK no injury from fall cont to work with PT Dilaudid helped more than oxy\par 7/23 he is doing well - mild pain stopped dilaudid and will finish oxy 15mg 6 times a day - then pain mgt will take over - the mult calls for meds an pain were due to confusion on who was getting his meds he was using his base dose of oxy with diladid breakthough - 14 days worth\par 8/20 he si doing well with rt knee and had severe calf pain so we will get Doppler no DVT -\par 9/23 he is dong ok and progressing - but still has pain - he feels he is not able to work - he is doing some light HEP - he is doing well with PT and we will cont this - having trouble with sit to stand due to quad weakness =- - oxy 15 mg #180 seeing painmgt for this - He also notes pain in his back neck and rt hand left arm and hand as well as left foot\par 10/29 he is doing better with PT and PT is helping his ROM and his strength - knee feel stiff to him and better after start walking -he is still unable to get out of chair without help his motion is only 3-100 and he still need a cane to walk and weak quads I feel it is important to cont with PT and build strength and ROM\par 11/26/19: Still some stiffness and weakness in knee. Still some difficulty with left knee also but has ongoing issues with his back which is most severe currently. I think he would still benefit from PT to work on ROM and strength.\par 1/7/20 he is doing ok - his rt knee is still stiff and motion only to about  he is still working at home and with PT for this - left knee feels ok but still having clunking and will need a partial synovectomy at some point\par 2/10/20: Doing ok - recc cont PT to work on ROM and strength. Left knee stable.\par 3/16/20: Had increasing leg pain related to Lspine which is now better since ZULEYMA - restart PT to get him back on track.\par 6/15 still has some pain in both knees as well as sig weakness- - he is getting cont w/u and treatment for his L spine - cont PT or now\par 7/27/20: No change - cont PT as this is helping and he is seeing pain management for his back.\par 9/15/20: Showing improvement - cont PT. He is happy with his right knee progress. Still some weakness in left leg and recc cont PT and pain management.\par 11/23/20: Knees unchanged - had recent Lspine surgery and will restart PT when cleared by spine surgeon. Will give rx for PT today.\par 3/2/21: Continues to have issues with stability in legs and quad weakness due to his multiple surgeries with recent fall causing him to break his wrist. Recommend PT for quad strengthening and mobility.\par 1/31/22: Patient with patella clunk and effusion - Will plan for synovetomy and at that time look at patella tracking and possible femoral loosening. He has discussed this for some time but due to multiple spine surgies had to delay until now. He has persistent issues with his left knee, at this time his right knee is doing better than his left.\par 10/11/22: He continues to have persistent issues with the knees. Will plan for synovetomy and at that time look at patella tracking and possible femoral loosening - planning for this after his elbow surgery\par \par 2/20/23: Left knee pain persists. Will plan for synovectomy, but will hold off for now due to recent sepsis.

## 2023-02-20 NOTE — HISTORY OF PRESENT ILLNESS
[Work related] : work related [8] : 8 [4] : 4 [Dull/Aching] : dull/aching [Constant] : constant [Household chores] : household chores [Leisure] : leisure [Meds] : meds [Ice] : ice [Heat] : heat [Sitting] : sitting [Standing] : standing [Walking] : walking [Not working due to injury] : Work status: not working due to injury [] : yes [de-identified] : 2/20/23: Left knee pain persists without relief. He was recently treated for sepsis and ended abx last week. He would like to move forward with knee surgery.\par \par Prev Doc:\par WC 11/17/15.\par 1/8/18: Patient known to me for left rev TKA for instability July 2017. Had postop INGRID which he felt helped his function but not his pain. Feels pain is worsening over the past 2 months. Known adv OA right knee. Increased lyrica since last visit which did not help.\par 2/5 he is having more pain and feels has new reddness lateral sup knee- he is having pain walking and wt bearing- rt knee is severe as well gave out on him and needs brace all the time - -\par 4/16/18: Still having difficulty with both knees. Going to PT and feels he is improving. Working on weight loss.\par 5/14/18: Has been doing well with weight loss, right knee inj helped a lot. Feels left knee pain is worsening.\par 6/18 rt knee is poping all to the time and leg giving on him - needs brace all the time- Left knee is stable but he has pain lateral with motion-\par 7/16/18: Left knee still some pain and clicking. Right knee had short term relief from inj.\par 8/20/18: Both knees still have pain. Right knee completed orthovisc last week which did not help that much.\par 10/1/18: Continued pain in both knees, right knee is getting worse.\par 12/3/18: Right knee pain is worsening since last time. Left popliteus inj helped.\par 1/7/19: Had CT right knee done. Waiting for TKA auth.\par 2/11/19: Worsening left knee pain and feels swollen x 2 weeks. He has been on his feet a lot with recent death in the family. Right knee continued pain - surgery auth pending.\par 3/11/19: Continued pain in both knees. Asp helped left knee. Right knee cont pain - had hearing 2 weeks ago and was told it was approved.\par 6/25 he took a fall after surgery and went to ED - inc ok - \par 7/23 he is doing better overall less pain and better motion - thinks it is going well - he has issues with Pain meds but it is because the pain mgt had stopped his meds and he was on 6 oxy a day \par 8/20 he was doing well but had episode of increased pain form knee to foot- he has been weaning his pain meds - oxy 15mg 4-6 daily - \par 9/23 cont to have some pain in the knee some swelling with recent cut on his foot walking with a limp- feels stiff with morning - having some shooting pain but has back isses - working hard with pain but is happy with his results todate \par 10/29 cont pain in the knee and he still has a lot of weakness difficulty to getting out of a chair\par 11/26/19: Right knee still some pain and stiffness, no PT auth lately. Had ZULEYMA last week which helped some of his back pain.\par 1/7/20 doing ok has pain in rt knee but related to ROM only to about 95 - he has sig back and neck and shoulder\par 2/10/20: Stiffness in knees, PT helps.\par 3/16/20: Had increasing leg pain which improved with ZULEYMA but set him back in PT lately.\par 6/15 he is seeing a new pain med doctor - told having sig spine issues - not doing PT and feel very weak\par 7/27/20: Going to PT, feeling better when he goes.\par 9/15/20: Improving with PT. had some increased pain a few days ago but resolving.\par 11/23/20: Recent Lspine surgery 5-6 weeks ago. Right knee is doing well, still with left knee pain.\par 3/2/21: Followed today for bilateral knees, recently fell due to his knee giving out on him and fractured his wrist. Being treated by Dr. Prado.\par 1/31/22: F/U with pain in b/l knees. Pain in the right knee has slowly getting better. Left knee is progressively getting worse.\par 10/11/22: He is continuing to have worsening left knee pain. Continues to clunk  [FreeTextEntry1] : B/L Knees [FreeTextEntry3] : 11/17/2015 [FreeTextEntry5] : Pt reports that LT knee pain has been very bothersome, and he would like to discuss TKR surgery.  Pt reports that LT knee is painful with all ADLs and activity.  Any period of walking aggravates the LT knee and intensifies pain.\par \par Pt had a sepsis infection in his shoulder and recently completed IV meds treatment. [FreeTextEntry7] : lt shin

## 2023-02-20 NOTE — IMAGING
[de-identified] : Right Knee: Inspection of the knee is as follows: mild effusion. Knee Range of Motion is as follows: Extension: 0 degrees. Flexion: 100 degrees. \par \par Left Knee: Inspection of the knee is as follows: no effusion. Palpation of the knee is as follows: crepitus. Knee Range of Motion is as follows: "clunk with ROM" \par \par

## 2023-02-20 NOTE — DISCUSSION/SUMMARY
[de-identified] : Progress note completed by Leela AVALOS under direct supervision of Graeme Chappell MD

## 2023-02-24 ENCOUNTER — APPOINTMENT (OUTPATIENT)
Dept: ORTHOPEDIC SURGERY | Facility: CLINIC | Age: 58
End: 2023-02-24
Payer: OTHER MISCELLANEOUS

## 2023-02-24 DIAGNOSIS — S46.912D STRAIN OF UNSPECIFIED MUSCLE, FASCIA AND TENDON AT SHOULDER AND UPPER ARM LEVEL, LEFT ARM, SUBSEQUENT ENCOUNTER: ICD-10-CM

## 2023-02-24 PROCEDURE — 99072 ADDL SUPL MATRL&STAF TM PHE: CPT

## 2023-02-24 PROCEDURE — 99214 OFFICE O/P EST MOD 30 MIN: CPT

## 2023-02-24 PROCEDURE — 73030 X-RAY EXAM OF SHOULDER: CPT | Mod: LT

## 2023-02-24 NOTE — ASSESSMENT
[FreeTextEntry1] : left shoulder pain. mri 2015 with tendonitis. recurrent left shoulder pain worse over years with use of cane for.  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  no recent fevers or chills.   just finished oral abx \par \par \par other body parts in this WC case 11/17/2015:\par history of right knee scope for pmm on 2/22/2016 done by me. s/p right tka on 6/19/2019 with dr urrutia.\par history of left tka (seeing dr urrutia). he is considering 3rd revision with him. \par neck and lower back pain (seeing dr. salazar)\par right elbow (seeing dr st - scheduling for surgery)\par \par patient has multiple issues as noted above and remains disabled.\par \par

## 2023-02-24 NOTE — WORK
[Sprain/Strain] : sprain/strain [Torn Ligament/Tendon/Muscle] : torn ligament, tendon or muscle [Total] : total [Cannot return to work because ________] : cannot return to work because [unfilled] [Lifting] : lifting [Pulling/Pushing] : pulling/pushing [Reaching overhead] : reaching overhead [Use of upper extremities] : use of upper extremities [Patient] : patient [No Rx restrictions] : No Rx restrictions. [I provided the services listed above] :  I provided the services listed above.

## 2023-02-24 NOTE — HISTORY OF PRESENT ILLNESS
[7] : 7 [Dull/Aching] : dull/aching [Stabbing] : stabbing [de-identified] : WC 11/17/2015\par 2/24/23:   follow up left shoulder.  pain again.   [FreeTextEntry1] : R Elbow, Shoulder & L shoulder

## 2023-02-24 NOTE — PHYSICAL EXAM
[4 ___] : forward flexion 4[unfilled]/5 [3___] : abduction 3[unfilled]/5 [4___] : internal rotation 4[unfilled]/5 [] : no erythema [Left] : left shoulder [There are no fractures, subluxations or dislocations. No significant abnormalities are seen] : There are no fractures, subluxations or dislocations. No significant abnormalities are seen [TWNoteComboBox7] : active forward flexion 90 degrees [de-identified] : active abduction 90 degrees [TWNoteComboBox6] : internal rotation L5 [de-identified] : external rotation 55 degrees [FreeTextEntry3] : some callus present in heel with some "cracking" of skin.  no active bleeding.

## 2023-02-27 ENCOUNTER — APPOINTMENT (OUTPATIENT)
Dept: MRI IMAGING | Facility: CLINIC | Age: 58
End: 2023-02-27

## 2023-03-03 ENCOUNTER — APPOINTMENT (OUTPATIENT)
Dept: ORTHOPEDIC SURGERY | Facility: CLINIC | Age: 58
End: 2023-03-03
Payer: OTHER MISCELLANEOUS

## 2023-03-03 VITALS — HEIGHT: 70 IN | WEIGHT: 280 LBS | BODY MASS INDEX: 40.09 KG/M2

## 2023-03-03 PROCEDURE — 99072 ADDL SUPL MATRL&STAF TM PHE: CPT

## 2023-03-03 PROCEDURE — 99213 OFFICE O/P EST LOW 20 MIN: CPT

## 2023-03-03 NOTE — IMAGING
[de-identified] : Right Knee: Inspection of the knee is as follows: mild effusion. Knee Range of Motion is as follows: Extension: 0 degrees. Flexion: 100 degrees. \par \par Left Knee: Inspection of the knee is as follows: no effusion. Palpation of the knee is as follows: crepitus. Knee Range of Motion is as follows: "clunk with ROM"  Clicking and lockingon the lateral knee with Active ROM \par \par

## 2023-03-03 NOTE — DISCUSSION/SUMMARY
[de-identified] : Progress note completed by Leela AVALOS under direct supervision of Graeme Chappell MD

## 2023-03-03 NOTE — HISTORY OF PRESENT ILLNESS
[Work related] : work related [Dull/Aching] : dull/aching [Intermittent] : intermittent [Household chores] : household chores [Leisure] : leisure [Nothing helps with pain getting better] : Nothing helps with pain getting better [Standing] : standing [Walking] : walking [9] : 9 [4] : 4 [Not working due to injury] : Work status: not working due to injury [de-identified] : 3/3/23: Patient has worsening pain in the left knee - Pain is severe and affecting his ADL's\par \par Prev Doc:\par WC 11/17/15.\par 1/8/18: Patient known to me for left rev TKA for instability July 2017. Had postop INGRID which he felt helped his function but not his pain. Feels pain is worsening over the past 2 months. Known adv OA right knee. Increased lyrica since last visit which did not help.\par 2/5 he is having more pain and feels has new reddness lateral sup knee- he is having pain walking and wt bearing- rt knee is severe as well gave out on him and needs brace all the time - -\par 4/16/18: Still having difficulty with both knees. Going to PT and feels he is improving. Working on weight loss.\par 5/14/18: Has been doing well with weight loss, right knee inj helped a lot. Feels left knee pain is worsening.\par 6/18 rt knee is poping all to the time and leg giving on him - needs brace all the time- Left knee is stable but he has pain lateral with motion-\par 7/16/18: Left knee still some pain and clicking. Right knee had short term relief from inj.\par 8/20/18: Both knees still have pain. Right knee completed orthovisc last week which did not help that much.\par 10/1/18: Continued pain in both knees, right knee is getting worse.\par 12/3/18: Right knee pain is worsening since last time. Left popliteus inj helped.\par 1/7/19: Had CT right knee done. Waiting for TKA auth.\par 2/11/19: Worsening left knee pain and feels swollen x 2 weeks. He has been on his feet a lot with recent death in the family. Right knee continued pain - surgery auth pending.\par 3/11/19: Continued pain in both knees. Asp helped left knee. Right knee cont pain - had hearing 2 weeks ago and was told it was approved.\par 6/25 he took a fall after surgery and went to ED - inc ok - \par 7/23 he is doing better overall less pain and better motion - thinks it is going well - he has issues with Pain meds but it is because the pain mgt had stopped his meds and he was on 6 oxy a day \par 8/20 he was doing well but had episode of increased pain form knee to foot- he has been weaning his pain meds - oxy 15mg 4-6 daily - \par 9/23 cont to have some pain in the knee some swelling with recent cut on his foot walking with a limp- feels stiff with morning - having some shooting pain but has back isses - working hard with pain but is happy with his results todate \par 10/29 cont pain in the knee and he still has a lot of weakness difficulty to getting out of a chair\par 11/26/19: Right knee still some pain and stiffness, no PT auth lately. Had ZULEYMA last week which helped some of his back pain.\par 1/7/20 doing ok has pain in rt knee but related to ROM only to about 95 - he has sig back and neck and shoulder\par 2/10/20: Stiffness in knees, PT helps.\par 3/16/20: Had increasing leg pain which improved with ZULEYMA but set him back in PT lately.\par 6/15 he is seeing a new pain med doctor - told having sig spine issues - not doing PT and feel very weak\par 7/27/20: Going to PT, feeling better when he goes.\par 9/15/20: Improving with PT. had some increased pain a few days ago but resolving.\par 11/23/20: Recent Lspine surgery 5-6 weeks ago. Right knee is doing well, still with left knee pain.\par 3/2/21: Followed today for bilateral knees, recently fell due to his knee giving out on him and fractured his wrist. Being treated by Dr. Prado.\par 1/31/22: F/U with pain in b/l knees. Pain in the right knee has slowly getting better. Left knee is progressively getting worse.\par 10/11/22: He is continuing to have worsening left knee pain. Continues to clunk \par 2/20/23: Left knee pain persists without relief. He was recently treated for sepsis and ended abx last week. He would like to move forward with knee surgery. [] : no [FreeTextEntry1] : left knee  [FreeTextEntry3] : 11/17/2015 [FreeTextEntry5] : Patient is here to follow up on left knee. Notes pain has not improved since last visit. Clicking while walking, and looking to schedule sx.

## 2023-03-03 NOTE — ASSESSMENT
[FreeTextEntry1] : Prev Doc:\par 1/8/18: Left rev TKA July 2017 - flexion to 100 today but still with neurologic symptoms in leg. Recc continue PT and weight loss.\par 2/5 cont to have lateral knee pain and sig guarding on the left knee I think when his pain improves his motion will improve - wt loss is going well - cont PT on the knee I feel the lateral knee is likely due to some bone healing on lateral fem chondyle - Left knee cont to give out on him and has feelings of instability in the rt knee need brace all the time - he Is not ready for TKA yet due to the trouble so n his left knee\par 4/16/18: Still with left knee pain and stiffness - continue with PT/HEP and weight loss. Right knee pain from OA - inj today.\par 5/14/18: He is concerned about increasing pain recently. No effusion or instability and his ROM is to 110 today which is better than in the past. Unable to get XRs today - if pain worsens will return for XRs however if he is improving will continue with PT and reeval in 1 month.\par 6/18 LEft knee still painful with lateral pain and some patella crepitus - but motin is ok an mild/mod swelling - left knee is more severe and pan is very bad with a lot of popping- I will give him steroid inj today due to severe pain\par 7/16/18: Left knee still with pain but stable and has good function. Right knee short term relief from cortisone - will try HA injections as he is trying to delay TKA.\par 8/20/18: Still with pain in both knees - completed orthovisc right knee last week so too soon to say if it helped. Left knee still with lateral pain along IT band recc continue PT and stretching for this.\par 10/1/18: Still with pain in both knees - right knee has progressed to adv OA on images taken with Dr. Javier last week. Hesitant to have right TKA as he is still having difficulty with left knee -havig some pain at [employer] may this si sanpping giving pain we will try inj here today\par 12/3/18: Left knee still with pain - working on getting second opinion. Right knee with adv OA and severe pain - given his difficulty with his left knee surgeries I recc we proceed with right TKA conformis implants. Need CT for surgical planning.\par 1/7/19: CT reviewed - proceed with right TKA conformis when authorized.\par 2/11/19: Left knee worsening pain and effusion could be from overuse - asp 5cc clear yellow fluid and sent to lab. Right knee cont pain and surgery auth pending, has hearing later this month.\par 3/11/19: No change - states he got auth for right TKA will plan ASAP. Had CT done already for conformis.\par 6/25 motion is OK no injury from fall cont to work with PT Dilaudid helped more than oxy\par 7/23 he is doing well - mild pain stopped dilaudid and will finish oxy 15mg 6 times a day - then pain mgt will take over - the mult calls for meds an pain were due to confusion on who was getting his meds he was using his base dose of oxy with diladid breakthough - 14 days worth\par 8/20 he si doing well with rt knee and had severe calf pain so we will get Doppler no DVT -\par 9/23 he is dong ok and progressing - but still has pain - he feels he is not able to work - he is doing some light HEP - he is doing well with PT and we will cont this - having trouble with sit to stand due to quad weakness =- - oxy 15 mg #180 seeing painmgt for this - He also notes pain in his back neck and rt hand left arm and hand as well as left foot\par 10/29 he is doing better with PT and PT is helping his ROM and his strength - knee feel stiff to him and better after start walking -he is still unable to get out of chair without help his motion is only 3-100 and he still need a cane to walk and weak quads I feel it is important to cont with PT and build strength and ROM\par 11/26/19: Still some stiffness and weakness in knee. Still some difficulty with left knee also but has ongoing issues with his back which is most severe currently. I think he would still benefit from PT to work on ROM and strength.\par 1/7/20 he is doing ok - his rt knee is still stiff and motion only to about  he is still working at home and with PT for this - left knee feels ok but still having clunking and will need a partial synovectomy at some point\par 2/10/20: Doing ok - recc cont PT to work on ROM and strength. Left knee stable.\par 3/16/20: Had increasing leg pain related to Lspine which is now better since ZULEYMA - restart PT to get him back on track.\par 6/15 still has some pain in both knees as well as sig weakness- - he is getting cont w/u and treatment for his L spine - cont PT or now\par 7/27/20: No change - cont PT as this is helping and he is seeing pain management for his back.\par 9/15/20: Showing improvement - cont PT. He is happy with his right knee progress. Still some weakness in left leg and recc cont PT and pain management.\par 11/23/20: Knees unchanged - had recent Lspine surgery and will restart PT when cleared by spine surgeon. Will give rx for PT today.\par 3/2/21: Continues to have issues with stability in legs and quad weakness due to his multiple surgeries with recent fall causing him to break his wrist. Recommend PT for quad strengthening and mobility.\par 1/31/22: Patient with patella clunk and effusion - Will plan for synovetomy and at that time look at patella tracking and possible femoral loosening. He has discussed this for some time but due to multiple spine surgies had to delay until now. He has persistent issues with his left knee, at this time his right knee is doing better than his left.\par 10/11/22: He continues to have persistent issues with the knees. Will plan for synovetomy and at that time look at patella tracking and possible femoral loosening - planning for this after his elbow surgery\par 2/20/23: Left knee pain persists. Will plan for synovectomy, but will hold off for now due to recent sepsis.\par \par 3/3/23: Cont sig left knee pain with TKA with chronic condition affecting outcome of the surgery - He will need a third surgery on the left knee due to patella clunk and synovitis - We are preparing for Rev TKA but will need to wait until medical issues have resolved -  we keep with closse followup.  We will plan for his surgery in june/july but his medical issue must be resolved

## 2023-03-08 ENCOUNTER — FORM ENCOUNTER (OUTPATIENT)
Age: 58
End: 2023-03-08

## 2023-03-09 ENCOUNTER — APPOINTMENT (OUTPATIENT)
Dept: MRI IMAGING | Facility: CLINIC | Age: 58
End: 2023-03-09
Payer: OTHER MISCELLANEOUS

## 2023-03-09 PROCEDURE — 73721 MRI JNT OF LWR EXTRE W/O DYE: CPT | Mod: LT

## 2023-03-09 PROCEDURE — 99072 ADDL SUPL MATRL&STAF TM PHE: CPT

## 2023-03-20 ENCOUNTER — FORM ENCOUNTER (OUTPATIENT)
Age: 58
End: 2023-03-20

## 2023-04-05 ENCOUNTER — APPOINTMENT (OUTPATIENT)
Dept: ORTHOPEDIC SURGERY | Facility: CLINIC | Age: 58
End: 2023-04-05
Payer: OTHER MISCELLANEOUS

## 2023-04-05 VITALS — WEIGHT: 280 LBS | BODY MASS INDEX: 40.09 KG/M2 | HEIGHT: 70 IN

## 2023-04-05 DIAGNOSIS — S46.012D STRAIN OF MUSCLE(S) AND TENDON(S) OF THE ROTATOR CUFF OF LEFT SHOULDER, SUBSEQUENT ENCOUNTER: ICD-10-CM

## 2023-04-05 DIAGNOSIS — M75.82 OTHER SHOULDER LESIONS, LEFT SHOULDER: ICD-10-CM

## 2023-04-05 PROCEDURE — 99214 OFFICE O/P EST MOD 30 MIN: CPT

## 2023-04-05 NOTE — WORK
[Sprain/Strain] : sprain/strain [Torn Ligament/Tendon/Muscle] : torn ligament, tendon or muscle [Cannot return to work because ________] : cannot return to work because [unfilled] [Lifting] : lifting [Pulling/Pushing] : pulling/pushing [Reaching overhead] : reaching overhead [Use of upper extremities] : use of upper extremities [Patient] : patient [No Rx restrictions] : No Rx restrictions. [I provided the services listed above] :  I provided the services listed above.

## 2023-04-05 NOTE — PHYSICAL EXAM
[4 ___] : forward flexion 4[unfilled]/5 [3___] : abduction 3[unfilled]/5 [4___] : internal rotation 4[unfilled]/5 [Left] : left shoulder [] : no erythema [TWNoteComboBox7] : active forward flexion 120 degrees [de-identified] : active abduction 90 degrees [TWNoteComboBox6] : internal rotation L5 [de-identified] : external rotation 55 degrees [FreeTextEntry3] : some callus present in heel with some "cracking" of skin.  no active bleeding.

## 2023-04-05 NOTE — ASSESSMENT
[FreeTextEntry1] : left shoulder pain. mri 2015 with tendonitis. mri 2023 with ptrct, biceps tendonitis, bone edema lesser tuberosity. 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  no recent fevers or chills.   just finished oral abx \par \par patient also has new kidney problems which is currently being worked up.\par \par \par other body parts in this WC case 11/17/2015:\par history of right knee scope for pmm on 2/22/2016 done by me. s/p right tka on 6/19/2019 with dr urrutia.\par history of left tka (seeing dr urrutia). he is considering 3rd revision with him. \par neck and lower back pain (seeing dr. salazar)\par right elbow (seeing dr st - scheduling for surgery)\par \par patient has multiple issues as noted above and remains disabled.\par \par

## 2023-04-05 NOTE — HISTORY OF PRESENT ILLNESS
[Work related] : work related [9] : 9 [Sharp] : sharp [] : yes [Constant] : constant [7] : 7 [Dull/Aching] : dull/aching [Stabbing] : stabbing [de-identified] :  11/17/2015\par 2/24/23:   follow up left shoulder.  pain again.  \par 4/5/23:  follow up left shoulder. [FreeTextEntry1] : R Elbow, Shoulder & L shoulder [FreeTextEntry3] : 11/17/15 [FreeTextEntry7] : down [de-identified] : MRI

## 2023-04-17 ENCOUNTER — APPOINTMENT (OUTPATIENT)
Dept: ORTHOPEDIC SURGERY | Facility: CLINIC | Age: 58
End: 2023-04-17
Payer: OTHER MISCELLANEOUS

## 2023-04-17 VITALS — WEIGHT: 280 LBS | BODY MASS INDEX: 40.09 KG/M2 | HEIGHT: 70 IN

## 2023-04-17 PROCEDURE — J3490M: CUSTOM

## 2023-04-17 PROCEDURE — 20551 NJX 1 TENDON ORIGIN/INSJ: CPT | Mod: RT

## 2023-04-17 PROCEDURE — 99214 OFFICE O/P EST MOD 30 MIN: CPT | Mod: 25

## 2023-04-17 NOTE — DISCUSSION/SUMMARY
[de-identified] : For R olecranon bursectomy (after 5/17)\par R/B/A of surgery discussed with the patient. Risks including but not limited to infection, nerve damage, tendon damage, pain, stiffness, recurrence, no resolution of symptoms, loss of function, limb or life. They understand and agree to surgery \par Return post op

## 2023-04-17 NOTE — ASSESSMENT
[FreeTextEntry1] : R olecranon injection was performed because of pain inflammation and stiffness\par Anesthesia: ethyl chloride sprayed topically\par Celestone: An injection of Celestone 1cc\par Lidocaine: An injection of Lidocaine 1% 1cc\par Marcaine: An injection of Marcaine 0.5% 1cc\par \par Patient has tried OTC's including aspirin, Ibuprofen, Aleve etc or prescription NSAIDS, and/or exercises at home and/ or\par physical therapy without satisfactory response.\par After verbal consent using sterile preparation and technique. The risks, benefits, and alternatives to cortisone injection\par were explained in full to the patient. Risks outlined include but are not limited to infection, sepsis, bleeding, scarring, skin\par discoloration, temporary increase in pain, syncopal episode, failure to resolve symptoms, allergic reaction, symptom\par recurrence, and elevation of blood sugar in diabetics. Patient understood the risks. All questions were answered. After\par discussion of options, patient requested an injection. Oral informed consent was obtained and sterile prep was done of the\par injection site. Sterile technique was utilized for the procedure including the preparation of the solutions used for the\par injection. Patient tolerated the procedure well. Advised to ice the injection site this evening.\par Prep with betadine locally to site. Sterile technique used

## 2023-04-17 NOTE — HISTORY OF PRESENT ILLNESS
[Work related] : work related [10] : 10 [Not working due to injury] : Work status: not working due to injury [] : yes [de-identified] : R olecranon bursitis\par  [FreeTextEntry1] : r elbow [de-identified] : none

## 2023-04-17 NOTE — PHYSICAL EXAM
[de-identified] : R elbow\par Swelling\par Tender olecrnaon, effusion\par No defect triceps\par \par \par \par R hand\par Mild swelling\par Stiffness\par Tender\par

## 2023-04-25 ENCOUNTER — APPOINTMENT (OUTPATIENT)
Dept: ORTHOPEDIC SURGERY | Facility: CLINIC | Age: 58
End: 2023-04-25

## 2023-05-03 ENCOUNTER — APPOINTMENT (OUTPATIENT)
Dept: ORTHOPEDIC SURGERY | Facility: CLINIC | Age: 58
End: 2023-05-03

## 2023-05-05 ENCOUNTER — APPOINTMENT (OUTPATIENT)
Dept: ORTHOPEDIC SURGERY | Facility: CLINIC | Age: 58
End: 2023-05-05
Payer: OTHER MISCELLANEOUS

## 2023-05-05 VITALS — HEIGHT: 70 IN | WEIGHT: 280 LBS | BODY MASS INDEX: 40.09 KG/M2

## 2023-05-05 DIAGNOSIS — S62.336D DISPLACED FRACTURE OF NECK OF FIFTH METACARPAL BONE, RIGHT HAND, SUBSEQUENT ENCOUNTER FOR FRACTURE WITH ROUTINE HEALING: ICD-10-CM

## 2023-05-05 DIAGNOSIS — S60.221A CONTUSION OF RIGHT HAND, INITIAL ENCOUNTER: ICD-10-CM

## 2023-05-05 DIAGNOSIS — S50.01XA CONTUSION OF RIGHT ELBOW, INITIAL ENCOUNTER: ICD-10-CM

## 2023-05-05 DIAGNOSIS — M70.21 OLECRANON BURSITIS, RIGHT ELBOW: ICD-10-CM

## 2023-05-05 DIAGNOSIS — S62.310D: ICD-10-CM

## 2023-05-05 DIAGNOSIS — S60.222D CONTUSION OF LEFT HAND, SUBSEQUENT ENCOUNTER: ICD-10-CM

## 2023-05-05 DIAGNOSIS — M54.12 RADICULOPATHY, CERVICAL REGION: ICD-10-CM

## 2023-05-05 DIAGNOSIS — M54.2 CERVICALGIA: ICD-10-CM

## 2023-05-05 PROCEDURE — 99213 OFFICE O/P EST LOW 20 MIN: CPT

## 2023-05-05 NOTE — HISTORY OF PRESENT ILLNESS
[Work related] : work related [5] : 5 [4] : 4 [Dull/Aching] : dull/aching [Not working due to injury] : Work status: not working due to injury [de-identified] : Bilateral hand pain, stiffness\par  [FreeTextEntry1] : hands, R elbow [de-identified] : non

## 2023-05-05 NOTE — PHYSICAL EXAM
[de-identified] : R elbow\par Swelling\par Tender olecrnaon\par ?defect triceps\par \par Xrays avulsion fx\par \par R hand\par Mild swelling\par Stiffness\par Tender\par \par Xrays no acute fx

## 2023-05-31 ENCOUNTER — FORM ENCOUNTER (OUTPATIENT)
Age: 58
End: 2023-05-31

## 2023-06-02 ENCOUNTER — FORM ENCOUNTER (OUTPATIENT)
Age: 58
End: 2023-06-02

## 2023-06-07 ENCOUNTER — APPOINTMENT (OUTPATIENT)
Dept: ORTHOPEDIC SURGERY | Facility: CLINIC | Age: 58
End: 2023-06-07

## 2023-09-17 ENCOUNTER — EMERGENCY (EMERGENCY)
Facility: HOSPITAL | Age: 58
LOS: 1 days | Discharge: HOME | End: 2023-09-17
Attending: EMERGENCY MEDICINE
Payer: SELF-PAY

## 2023-09-17 VITALS
DIASTOLIC BLOOD PRESSURE: 93 MMHG | TEMPERATURE: 98 F | RESPIRATION RATE: 18 BRPM | OXYGEN SATURATION: 95 % | SYSTOLIC BLOOD PRESSURE: 146 MMHG | WEIGHT: 265 LBS | HEART RATE: 70 BPM

## 2023-09-17 DIAGNOSIS — Z96.659 PRESENCE OF UNSPECIFIED ARTIFICIAL KNEE JOINT: Chronic | ICD-10-CM

## 2023-09-17 DIAGNOSIS — Z98.890 OTHER SPECIFIED POSTPROCEDURAL STATES: Chronic | ICD-10-CM

## 2023-09-17 DIAGNOSIS — Z98.89 OTHER SPECIFIED POSTPROCEDURAL STATES: Chronic | ICD-10-CM

## 2023-09-17 PROCEDURE — 12001 RPR S/N/AX/GEN/TRNK 2.5CM/<: CPT | Mod: F9

## 2023-09-17 PROCEDURE — 73140 X-RAY EXAM OF FINGER(S): CPT | Mod: 26,RT

## 2023-09-17 PROCEDURE — 12001 RPR S/N/AX/GEN/TRNK 2.5CM/<: CPT

## 2023-09-17 PROCEDURE — 73140 X-RAY EXAM OF FINGER(S): CPT

## 2023-09-17 PROCEDURE — 73080 X-RAY EXAM OF ELBOW: CPT

## 2023-09-17 PROCEDURE — 99284 EMERGENCY DEPT VISIT MOD MDM: CPT | Mod: 25

## 2023-09-17 PROCEDURE — 73080 X-RAY EXAM OF ELBOW: CPT | Mod: 26,RT

## 2023-09-17 RX ORDER — CEPHALEXIN 500 MG
500 CAPSULE ORAL ONCE
Refills: 0 | Status: COMPLETED | OUTPATIENT
Start: 2023-09-17 | End: 2023-09-17

## 2023-09-17 RX ORDER — CEPHALEXIN 500 MG
1 CAPSULE ORAL
Qty: 28 | Refills: 0
Start: 2023-09-17 | End: 2023-09-23

## 2023-09-17 RX ADMIN — Medication 500 MILLIGRAM(S): at 18:32

## 2023-09-17 NOTE — ED PROVIDER NOTE - ATTENDING APP SHARED VISIT CONTRIBUTION OF CARE
58yo M with crush injury to right hand/5th digit. notes got crushed by a door at 2am. Denies f/c/n/v/cp/sob/palpitations/ cough/rash/headache/dizziness/abd.pain/d/c/dysuria/hematuria    FROM to all digits + abrasion to dorsum of 5th digit and small 1cm lac to volar aspect radial pulse 2    initially said inj happened 24hrs ago now said that happened at 2am and would like the wound repaired will get stitches xray neruvascularly intact

## 2023-09-17 NOTE — ED PROVIDER NOTE - CLINICAL SUMMARY MEDICAL DECISION MAKING FREE TEXT BOX
57M presenting to the ED c/o right 5th digit injury that occurred at 2am last night. Laceration cleansed/repair. Pt stable for d/c. Xrays reviewed - no fx.

## 2023-09-17 NOTE — ED PROVIDER NOTE - NSFOLLOWUPINSTRUCTIONS_ED_ALL_ED_FT

## 2023-09-17 NOTE — ED ADULT NURSE NOTE - OBJECTIVE STATEMENT
pt a&ox3, vss c/o L hand pinky pain after hurting it @ work, respirations even and unlabored will continue to reassess.

## 2023-09-17 NOTE — ED PROVIDER NOTE - PATIENT PORTAL LINK FT
You can access the FollowMyHealth Patient Portal offered by [facility] by registering at the following website: [email or web address]. By joining [employer]’s FollowMyHealth portal, you will also be able to view your health information using other applications (apps) compatible with our system.

## 2023-09-17 NOTE — ED ADULT NURSE REASSESSMENT NOTE - NS ED NURSE REASSESS COMMENT FT1
pt acting aggressive towards RN and PA, stating "I am being ignored, I need a room now". pt unhappy that he had to wait 40 minutes to be seen by a PA. pt resting comfortably in chair on phone.

## 2023-09-17 NOTE — ED PROVIDER NOTE - OBJECTIVE STATEMENT
57M presenting to the ED c/o right 5th digit injury that occurred at 2am last night. Pt states that he fell forward and got his finger crushed into a door. Pt also c/o mild right elbow pain. Pt denies numbness/tingling and has no other complaints at this time.

## 2023-12-18 ENCOUNTER — APPOINTMENT (OUTPATIENT)
Dept: ORTHOPEDIC SURGERY | Facility: CLINIC | Age: 58
End: 2023-12-18
Payer: OTHER MISCELLANEOUS

## 2023-12-18 DIAGNOSIS — Z96.652 PRESENCE OF LEFT ARTIFICIAL KNEE JOINT: ICD-10-CM

## 2023-12-18 PROCEDURE — 99215 OFFICE O/P EST HI 40 MIN: CPT

## 2023-12-18 NOTE — DISCUSSION/SUMMARY
[de-identified] : We had an extensive discussion regarding surgical intervention including risk, benefits and alternatives.  The risks include, but are not limited to infection, bleeding, injury to small nerves and blood vessels, pain, stiffness, phlebitis, DVT malunion, nonunion, and need for secondary procedures.  Preoperative, intraoperative and postoperative care were discussed and outlined to the patient as well.  The patient has had an opportunity to ask any question and all were answered to the best of my ability.

## 2023-12-18 NOTE — HISTORY OF PRESENT ILLNESS
[Work related] : work related [7] : 7 [Rest] : rest [Walking] : walking [de-identified] : 12/18/23: Cont pain and clicking in left knee.  Seeing kidney specialist, has not been able to get cleared for surgery yet.  Prev Doc: WC 11/17/15. 1/8/18: Patient known to me for left rev TKA for instability July 2017. Had postop INGRID which he felt helped his function but not his pain. Feels pain is worsening over the past 2 months. Known adv OA right knee. Increased lyrica since last visit which did not help. 2/5 he is having more pain and feels has new reddness lateral sup knee- he is having pain walking and wt bearing- rt knee is severe as well gave out on him and needs brace all the time - - 4/16/18: Still having difficulty with both knees. Going to PT and feels he is improving. Working on weight loss. 5/14/18: Has been doing well with weight loss, right knee inj helped a lot. Feels left knee pain is worsening. 6/18 rt knee is poping all to the time and leg giving on him - needs brace all the time- Left knee is stable but he has pain lateral with motion- 7/16/18: Left knee still some pain and clicking. Right knee had short term relief from inj. 8/20/18: Both knees still have pain. Right knee completed orthovisc last week which did not help that much. 10/1/18: Continued pain in both knees, right knee is getting worse. 12/3/18: Right knee pain is worsening since last time. Left popliteus inj helped. 1/7/19: Had CT right knee done. Waiting for TKA auth. 2/11/19: Worsening left knee pain and feels swollen x 2 weeks. He has been on his feet a lot with recent death in the family. Right knee continued pain - surgery auth pending. 3/11/19: Continued pain in both knees. Asp helped left knee. Right knee cont pain - had hearing 2 weeks ago and was told it was approved. 6/25 he took a fall after surgery and went to ED - inc ok -  7/23 he is doing better overall less pain and better motion - thinks it is going well - he has issues with Pain meds but it is because the pain mgt had stopped his meds and he was on 6 oxy a day  8/20 he was doing well but had episode of increased pain form knee to foot- he has been weaning his pain meds - oxy 15mg 4-6 daily -  9/23 cont to have some pain in the knee some swelling with recent cut on his foot walking with a limp- feels stiff with morning - having some shooting pain but has back isses - working hard with pain but is happy with his results todate  10/29 cont pain in the knee and he still has a lot of weakness difficulty to getting out of a chair 11/26/19: Right knee still some pain and stiffness, no PT auth lately. Had ZULEYMA last week which helped some of his back pain. 1/7/20 doing ok has pain in rt knee but related to ROM only to about 95 - he has sig back and neck and shoulder 2/10/20: Stiffness in knees, PT helps. 3/16/20: Had increasing leg pain which improved with ZULEYMA but set him back in PT lately. 6/15 he is seeing a new pain med doctor - told having sig spine issues - not doing PT and feel very weak 7/27/20: Going to PT, feeling better when he goes. 9/15/20: Improving with PT. had some increased pain a few days ago but resolving. 11/23/20: Recent Lspine surgery 5-6 weeks ago. Right knee is doing well, still with left knee pain. 3/2/21: Followed today for bilateral knees, recently fell due to his knee giving out on him and fractured his wrist. Being treated by Dr. Prado. 1/31/22: F/U with pain in b/l knees. Pain in the right knee has slowly getting better. Left knee is progressively getting worse. 10/11/22: He is continuing to have worsening left knee pain. Continues to clunk  [FreeTextEntry1] : left knee [FreeTextEntry3] : 11/17/15

## 2023-12-18 NOTE — ASSESSMENT
[FreeTextEntry1] : Prev Doc: 1/8/18: Left rev TKA July 2017 - flexion to 100 today but still with neurologic symptoms in leg. Recc continue PT and weight loss. 2/5 cont to have lateral knee pain and sig guarding on the left knee I think when his pain improves his motion will improve - wt loss is going well - cont PT on the knee I feel the lateral knee is likely due to some bone healing on lateral fem chondyle - Left knee cont to give out on him and has feelings of instability in the rt knee need brace all the time - he Is not ready for TKA yet due to the trouble so n his left knee 4/16/18: Still with left knee pain and stiffness - continue with PT/HEP and weight loss. Right knee pain from OA - inj today 5/14/18: He is concerned about increasing pain recently. No effusion or instability and his ROM is to 110 today which is better than in the past. Unable to get XRs today - if pain worsens will return for XRs however if he is improving will continue with PT and reeval in 1 month. 6/18 LEft knee still painful with lateral pain and some patella crepitus - but motin is ok an mild/mod swelling - left knee is more severe and pan is very bad with a lot of popping- I will give him steroid inj today due to severe pain 7/16/18: Left knee still with pain but stable and has good function. Right knee short term relief from cortisone - will try HA injections as he is trying to delay TKA. 8/20/18: Still with pain in both knees - completed orthovisc right knee last week so too soon to say if it helped. Left knee still with lateral pain along IT band recc continue PT and stretching for this. 10/1/18: Still with pain in both knees - right knee has progressed to adv OA on images taken with Dr. Javier last week. Hesitant to have right TKA as he is still having difficulty with left knee -havig some pain at [place] may this si sanpping giving pain we will try inj here today 12/3/18: Left knee still with pain - working on getting second opinion. Right knee with adv OA and severe pain - given his difficulty with his left knee surgeries I recc we proceed with right TKA conformis implants. Need CT for surgical planning. 1/7/19: CT reviewed - proceed with right TKA conformis when authorized. 2/11/19: Left knee worsening pain and effusion could be from overuse - asp 5cc clear yellow fluid and sent to lab. Right knee cont pain and surgery auth pending, has hearing later this month. 3/11/19: No change - states he got auth for right TKA will plan ASAP. Had CT done already for conformis. 6/25 motion is OK no injury from fall cont to work with PT Dilaudid helped more than oxy 7/23 he is doing well - mild pain stopped dilaudid and will finish oxy 15mg 6 times a day - then pain mgt will take over - the mult calls for meds an pain were due to confusion on who was getting his meds he was using his base dose of oxy with diladid breakthough - 14 days worth 8/20 he si doing well with rt knee and had severe calf pain so we will get Doppler no DVT - 9/23 he is dong ok and progressing - but still has pain - he feels he is not able to work - he is doing some light HEP - he is doing well with PT and we will cont this - having trouble with sit to stand due to quad weakness =- - oxy 15 mg #180 seeing painmgt for this - He also notes pain in his back neck and rt hand left arm and hand as well as left foot 10/29 he is doing better with PT and PT is helping his ROM and his strength - knee feel stiff to him and better after start walking -he is still unable to get out of chair without help his motion is only 3-100 and he still need a cane to walk and weak quads I feel it is important to cont with PT and build strength and ROM 11/26/19: Still some stiffness and weakness in knee. Still some difficulty with left knee also but has ongoing issues with his back which is most severe currently. I think he would still benefit from PT to work on ROM and strength. 1/7/20 he is doing ok - his rt knee is still stiff and motion only to about  he is still working at home and with PT for this - left knee feels ok but still having clunking and will need a partial synovectomy at some point 2/10/20: Doing ok - recc cont PT to work on ROM and strength. Left knee stable. 3/16/20: Had increasing leg pain related to Lspine which is now better since ZULEYMA - restart PT to get him back on track. 6/15 still has some pain in both knees as well as sig weakness- - he is getting cont w/u and treatment for his L spine - cont PT or now 7/27/20: No change - cont PT as this is helping and he is seeing pain management for his back. 9/15/20: Showing improvement - cont PT. He is happy with his right knee progress. Still some weakness in left leg and recc cont PT and pain management. 11/23/20: Knees unchanged - had recent Lspine surgery and will restart PT when cleared by spine surgeon. Will give rx for PT today. 3/2/21: Continues to have issues with stability in legs and quad weakness due to his multiple surgeries with recent fall causing him to break his wrist. Recommend PT for quad strengthening and mobility. 1/31/22: Patient with patella clunk and effusion - Will plan for synovetomy and at that time look at patella tracking and possible femoral loosening. He has discussed this for some time but due to multiple spine surgies had to delay until now. He has persistent issues with his left knee, at this time his right knee is doing better than his left. 10/11/22: He continues to have persistent issues with the knees. Will plan for synovetomy and at that time look at patella tracking and possible femoral loosening - planning for this after his elbow surgery 2/20/23: Left knee pain persists. Will plan for synovectomy, but will hold off for now due to recent sepsis. 3/3/23: Cont sig left knee pain with TKA with chronic condition affecting outcome of the surgery - He will need a third surgery on the left knee due to patella clunk and synovitis - We are preparing for Rev TKA but will need to wait until medical issues have resolved - we keep with closse followup. We will plan for his surgery in june/july but his medical issue must be resolved.  12/18/23: Cont pain and clunk - will plan for left rev TKA open synovectomy and poly exchange in the spring.  He is still working on treatment with kidney specialist - this needs to be resolved before he can proceed with surgery.

## 2023-12-18 NOTE — IMAGING
[de-identified] : Right Knee: Inspection of the knee is as follows: mild effusion. Knee Range of Motion is as follows: Extension: 0 degrees. Flexion: 100 degrees. \par  \par  Left Knee: Inspection of the knee is as follows: mild effusion. Palpation of the knee is as follows: crepitus. Knee Range of Motion is as follows: "clunk with ROM" \par  \par

## 2024-01-12 NOTE — ED PROCEDURE NOTE - NUMBER OF SUTURES
Problem: Adult Inpatient Plan of Care  Goal: Plan of Care Review  Outcome: Ongoing, Progressing  Goal: Patient-Specific Goal (Individualized)  Outcome: Ongoing, Progressing  Goal: Absence of Hospital-Acquired Illness or Injury  Outcome: Ongoing, Progressing  Goal: Optimal Comfort and Wellbeing  Outcome: Ongoing, Progressing  Goal: Readiness for Transition of Care  Outcome: Ongoing, Progressing     Problem:  Fall Injury Risk  Goal: Absence of Fall, Infant Drop and Related Injury  Outcome: Ongoing, Progressing     Problem: Pain (Postpartum Vaginal Delivery)  Goal: Acceptable Pain Control  Outcome: Ongoing, Progressing     Problem: Urinary Retention (Postpartum Vaginal Delivery)  Goal: Effective Urinary Elimination  Outcome: Ongoing, Progressing      3

## 2024-03-18 ENCOUNTER — APPOINTMENT (OUTPATIENT)
Dept: ORTHOPEDIC SURGERY | Facility: CLINIC | Age: 59
End: 2024-03-18

## 2024-04-10 ENCOUNTER — OFFICE (OUTPATIENT)
Dept: URBAN - METROPOLITAN AREA CLINIC 116 | Facility: CLINIC | Age: 59
Setting detail: OPHTHALMOLOGY
End: 2024-04-10
Payer: COMMERCIAL

## 2024-04-10 DIAGNOSIS — H52.4: ICD-10-CM

## 2024-04-10 DIAGNOSIS — H02.824: ICD-10-CM

## 2024-04-10 PROCEDURE — 92004 COMPRE OPH EXAM NEW PT 1/>: CPT | Performed by: OPTOMETRIST

## 2024-04-10 PROCEDURE — 92015 DETERMINE REFRACTIVE STATE: CPT | Performed by: OPTOMETRIST

## 2024-04-10 ASSESSMENT — LID POSITION - PTOSIS: OS_PTOSIS: LUL 1+ 2+

## 2024-04-23 ENCOUNTER — OFFICE (OUTPATIENT)
Dept: URBAN - METROPOLITAN AREA CLINIC 112 | Facility: CLINIC | Age: 59
Setting detail: OPHTHALMOLOGY
End: 2024-04-23
Payer: COMMERCIAL

## 2024-04-23 DIAGNOSIS — H02.824: ICD-10-CM

## 2024-04-23 PROBLEM — H02.40 PTOSIS OF EYELID, UNSPECIFIED; LEFT EYE: Status: ACTIVE | Noted: 2024-04-10

## 2024-04-23 PROCEDURE — 92012 INTRM OPH EXAM EST PATIENT: CPT | Performed by: OPHTHALMOLOGY

## 2024-04-23 ASSESSMENT — LID POSITION - PTOSIS: OS_PTOSIS: LUL 1+ 2+

## 2024-11-21 ENCOUNTER — TRANSCRIPTION ENCOUNTER (OUTPATIENT)
Age: 59
End: 2024-11-21

## 2025-03-31 ENCOUNTER — APPOINTMENT (OUTPATIENT)
Dept: ORTHOPEDIC SURGERY | Facility: CLINIC | Age: 60
End: 2025-03-31

## 2025-06-16 ENCOUNTER — APPOINTMENT (OUTPATIENT)
Dept: ORTHOPEDIC SURGERY | Facility: CLINIC | Age: 60
End: 2025-06-16
Payer: OTHER MISCELLANEOUS

## 2025-06-16 VITALS — BODY MASS INDEX: 40.09 KG/M2 | HEIGHT: 70 IN | WEIGHT: 280 LBS

## 2025-06-16 PROCEDURE — 99213 OFFICE O/P EST LOW 20 MIN: CPT

## 2025-06-16 PROCEDURE — 73562 X-RAY EXAM OF KNEE 3: CPT | Mod: 50

## 2025-07-02 ENCOUNTER — APPOINTMENT (OUTPATIENT)
Dept: ORTHOPEDIC SURGERY | Facility: CLINIC | Age: 60
End: 2025-07-02

## 2025-07-29 NOTE — ED PROVIDER NOTE - WR ORDER ID 1
Patients last appointment 5/22/2025.  Patients next scheduled appointment   Future Appointments   Date Time Provider Department Center   8/25/2025  8:45 AM Jorden Wan DO [facility] DEP      
[id number]